# Patient Record
Sex: FEMALE | Race: WHITE | NOT HISPANIC OR LATINO | URBAN - METROPOLITAN AREA
[De-identification: names, ages, dates, MRNs, and addresses within clinical notes are randomized per-mention and may not be internally consistent; named-entity substitution may affect disease eponyms.]

---

## 2019-08-10 ENCOUNTER — INPATIENT (INPATIENT)
Facility: HOSPITAL | Age: 84
LOS: 9 days | Discharge: NURSING, REHAB OR LONG-TERM CARE | DRG: 177 | End: 2019-08-20
Attending: INTERNAL MEDICINE | Admitting: INTERNAL MEDICINE
Payer: COMMERCIAL

## 2019-08-10 VITALS
WEIGHT: 210.1 LBS | RESPIRATION RATE: 22 BRPM | OXYGEN SATURATION: 95 % | DIASTOLIC BLOOD PRESSURE: 90 MMHG | TEMPERATURE: 99 F | HEART RATE: 114 BPM | SYSTOLIC BLOOD PRESSURE: 136 MMHG

## 2019-08-10 DIAGNOSIS — C50.919 MALIGNANT NEOPLASM OF UNSPECIFIED SITE OF UNSPECIFIED FEMALE BREAST: ICD-10-CM

## 2019-08-10 DIAGNOSIS — E11.9 TYPE 2 DIABETES MELLITUS WITHOUT COMPLICATIONS: ICD-10-CM

## 2019-08-10 DIAGNOSIS — J18.1 LOBAR PNEUMONIA, UNSPECIFIED ORGANISM: ICD-10-CM

## 2019-08-10 LAB
APTT BLD: 29.2 SEC — SIGNIFICANT CHANGE UP (ref 28.5–37)
BASOPHILS # BLD AUTO: 0.06 K/UL — SIGNIFICANT CHANGE UP (ref 0–0.2)
BASOPHILS NFR BLD AUTO: 0.3 % — SIGNIFICANT CHANGE UP (ref 0–2)
EOSINOPHIL # BLD AUTO: 0.01 K/UL — SIGNIFICANT CHANGE UP (ref 0–0.5)
EOSINOPHIL NFR BLD AUTO: 0.1 % — SIGNIFICANT CHANGE UP (ref 0–6)
HCT VFR BLD CALC: 44.8 % — SIGNIFICANT CHANGE UP (ref 34.5–45)
HGB BLD-MCNC: 14 G/DL — SIGNIFICANT CHANGE UP (ref 11.5–15.5)
IMM GRANULOCYTES NFR BLD AUTO: 4.4 % — HIGH (ref 0–1.5)
INR BLD: 1.21 RATIO — HIGH (ref 0.88–1.16)
LACTATE SERPL-SCNC: 1.1 MMOL/L — SIGNIFICANT CHANGE UP (ref 0.7–2)
LYMPHOCYTES # BLD AUTO: 0.91 K/UL — LOW (ref 1–3.3)
LYMPHOCYTES # BLD AUTO: 4.9 % — LOW (ref 13–44)
MCHC RBC-ENTMCNC: 26.7 PG — LOW (ref 27–34)
MCHC RBC-ENTMCNC: 31.3 GM/DL — LOW (ref 32–36)
MCV RBC AUTO: 85.3 FL — SIGNIFICANT CHANGE UP (ref 80–100)
MONOCYTES # BLD AUTO: 2.59 K/UL — HIGH (ref 0–0.9)
MONOCYTES NFR BLD AUTO: 13.9 % — SIGNIFICANT CHANGE UP (ref 2–14)
NEUTROPHILS # BLD AUTO: 14.29 K/UL — HIGH (ref 1.8–7.4)
NEUTROPHILS NFR BLD AUTO: 76.4 % — SIGNIFICANT CHANGE UP (ref 43–77)
NRBC # BLD: 0 /100 WBCS — SIGNIFICANT CHANGE UP (ref 0–0)
PLATELET # BLD AUTO: 136 K/UL — LOW (ref 150–400)
PROTHROM AB SERPL-ACNC: 13.8 SEC — HIGH (ref 10–12.9)
RBC # BLD: 5.25 M/UL — HIGH (ref 3.8–5.2)
RBC # FLD: 15.5 % — HIGH (ref 10.3–14.5)
WBC # BLD: 18.69 K/UL — HIGH (ref 3.8–10.5)
WBC # FLD AUTO: 18.69 K/UL — HIGH (ref 3.8–10.5)

## 2019-08-10 PROCEDURE — 93010 ELECTROCARDIOGRAM REPORT: CPT

## 2019-08-10 PROCEDURE — 71045 X-RAY EXAM CHEST 1 VIEW: CPT | Mod: 26

## 2019-08-10 PROCEDURE — 99285 EMERGENCY DEPT VISIT HI MDM: CPT

## 2019-08-10 RX ORDER — PIPERACILLIN AND TAZOBACTAM 4; .5 G/20ML; G/20ML
3.38 INJECTION, POWDER, LYOPHILIZED, FOR SOLUTION INTRAVENOUS EVERY 12 HOURS
Refills: 0 | Status: DISCONTINUED | OUTPATIENT
Start: 2019-08-10 | End: 2019-08-18

## 2019-08-10 RX ORDER — PREGABALIN 225 MG/1
1000 CAPSULE ORAL DAILY
Refills: 0 | Status: DISCONTINUED | OUTPATIENT
Start: 2019-08-10 | End: 2019-08-20

## 2019-08-10 RX ORDER — PYRIDOXINE HCL (VITAMIN B6) 100 MG
1 TABLET ORAL
Qty: 0 | Refills: 0 | DISCHARGE

## 2019-08-10 RX ORDER — VANCOMYCIN HCL 1 G
1000 VIAL (EA) INTRAVENOUS ONCE
Refills: 0 | Status: COMPLETED | OUTPATIENT
Start: 2019-08-10 | End: 2019-08-10

## 2019-08-10 RX ORDER — DEXTROSE 50 % IN WATER 50 %
15 SYRINGE (ML) INTRAVENOUS ONCE
Refills: 0 | Status: DISCONTINUED | OUTPATIENT
Start: 2019-08-10 | End: 2019-08-20

## 2019-08-10 RX ORDER — LETROZOLE 2.5 MG/1
2.5 TABLET, FILM COATED ORAL DAILY
Refills: 0 | Status: DISCONTINUED | OUTPATIENT
Start: 2019-08-10 | End: 2019-08-20

## 2019-08-10 RX ORDER — OXYBUTYNIN CHLORIDE 5 MG
1 TABLET ORAL
Qty: 0 | Refills: 0 | DISCHARGE

## 2019-08-10 RX ORDER — DEXTROSE 50 % IN WATER 50 %
25 SYRINGE (ML) INTRAVENOUS ONCE
Refills: 0 | Status: DISCONTINUED | OUTPATIENT
Start: 2019-08-10 | End: 2019-08-20

## 2019-08-10 RX ORDER — SODIUM CHLORIDE 9 MG/ML
1000 INJECTION, SOLUTION INTRAVENOUS
Refills: 0 | Status: DISCONTINUED | OUTPATIENT
Start: 2019-08-10 | End: 2019-08-20

## 2019-08-10 RX ORDER — ACETAMINOPHEN 500 MG
650 TABLET ORAL EVERY 4 HOURS
Refills: 0 | Status: DISCONTINUED | OUTPATIENT
Start: 2019-08-10 | End: 2019-08-20

## 2019-08-10 RX ORDER — PYRIDOXINE HCL (VITAMIN B6) 100 MG
50 TABLET ORAL DAILY
Refills: 0 | Status: DISCONTINUED | OUTPATIENT
Start: 2019-08-10 | End: 2019-08-20

## 2019-08-10 RX ORDER — SITAGLIPTIN AND METFORMIN HYDROCHLORIDE 500; 50 MG/1; MG/1
1 TABLET, FILM COATED ORAL
Qty: 0 | Refills: 0 | DISCHARGE

## 2019-08-10 RX ORDER — DEXTROSE 50 % IN WATER 50 %
12.5 SYRINGE (ML) INTRAVENOUS ONCE
Refills: 0 | Status: DISCONTINUED | OUTPATIENT
Start: 2019-08-10 | End: 2019-08-20

## 2019-08-10 RX ORDER — LETROZOLE 2.5 MG/1
1 TABLET, FILM COATED ORAL
Qty: 0 | Refills: 0 | DISCHARGE

## 2019-08-10 RX ORDER — INSULIN LISPRO 100/ML
VIAL (ML) SUBCUTANEOUS
Refills: 0 | Status: DISCONTINUED | OUTPATIENT
Start: 2019-08-10 | End: 2019-08-20

## 2019-08-10 RX ORDER — LEVETIRACETAM 250 MG/1
500 TABLET, FILM COATED ORAL
Refills: 0 | Status: DISCONTINUED | OUTPATIENT
Start: 2019-08-10 | End: 2019-08-13

## 2019-08-10 RX ORDER — GLUCAGON INJECTION, SOLUTION 0.5 MG/.1ML
1 INJECTION, SOLUTION SUBCUTANEOUS ONCE
Refills: 0 | Status: DISCONTINUED | OUTPATIENT
Start: 2019-08-10 | End: 2019-08-20

## 2019-08-10 RX ORDER — PIPERACILLIN AND TAZOBACTAM 4; .5 G/20ML; G/20ML
3.38 INJECTION, POWDER, LYOPHILIZED, FOR SOLUTION INTRAVENOUS ONCE
Refills: 0 | Status: COMPLETED | OUTPATIENT
Start: 2019-08-10 | End: 2019-08-10

## 2019-08-10 RX ORDER — SODIUM CHLORIDE 9 MG/ML
1000 INJECTION, SOLUTION INTRAVENOUS
Refills: 0 | Status: DISCONTINUED | OUTPATIENT
Start: 2019-08-10 | End: 2019-08-16

## 2019-08-10 RX ORDER — SODIUM CHLORIDE 9 MG/ML
1000 INJECTION INTRAMUSCULAR; INTRAVENOUS; SUBCUTANEOUS ONCE
Refills: 0 | Status: COMPLETED | OUTPATIENT
Start: 2019-08-10 | End: 2019-08-10

## 2019-08-10 RX ADMIN — Medication 650 MILLIGRAM(S): at 19:20

## 2019-08-10 RX ADMIN — SODIUM CHLORIDE 1000 MILLILITER(S): 9 INJECTION INTRAMUSCULAR; INTRAVENOUS; SUBCUTANEOUS at 07:50

## 2019-08-10 RX ADMIN — SODIUM CHLORIDE 1000 MILLILITER(S): 9 INJECTION INTRAMUSCULAR; INTRAVENOUS; SUBCUTANEOUS at 08:50

## 2019-08-10 RX ADMIN — Medication 650 MILLIGRAM(S): at 18:45

## 2019-08-10 RX ADMIN — LETROZOLE 2.5 MILLIGRAM(S): 2.5 TABLET, FILM COATED ORAL at 17:08

## 2019-08-10 RX ADMIN — PIPERACILLIN AND TAZOBACTAM 3.38 GRAM(S): 4; .5 INJECTION, POWDER, LYOPHILIZED, FOR SOLUTION INTRAVENOUS at 08:11

## 2019-08-10 RX ADMIN — LEVETIRACETAM 500 MILLIGRAM(S): 250 TABLET, FILM COATED ORAL at 17:09

## 2019-08-10 RX ADMIN — Medication 1: at 17:24

## 2019-08-10 RX ADMIN — SODIUM CHLORIDE 80 MILLILITER(S): 9 INJECTION, SOLUTION INTRAVENOUS at 14:41

## 2019-08-10 RX ADMIN — PIPERACILLIN AND TAZOBACTAM 25 GRAM(S): 4; .5 INJECTION, POWDER, LYOPHILIZED, FOR SOLUTION INTRAVENOUS at 17:10

## 2019-08-10 RX ADMIN — Medication 1000 MILLIGRAM(S): at 10:01

## 2019-08-10 RX ADMIN — PIPERACILLIN AND TAZOBACTAM 200 GRAM(S): 4; .5 INJECTION, POWDER, LYOPHILIZED, FOR SOLUTION INTRAVENOUS at 07:51

## 2019-08-10 RX ADMIN — Medication 250 MILLIGRAM(S): at 09:01

## 2019-08-10 NOTE — H&P ADULT - HISTORY OF PRESENT ILLNESS
pt  with increased  temp and  sob  transferred from nh to er  cxr  shows rll infiltrate and wbc  elevated

## 2019-08-10 NOTE — ED ADULT NURSE REASSESSMENT NOTE - NS ED NURSE REASSESS COMMENT FT1
Patient stable and baseline mental status.   Denies pain or disocmfort at this time.  Care done for pt.  Pt  Md aware.  Awaiting transport to floor.  Ongoing nursing care and safety maintained.

## 2019-08-10 NOTE — H&P ADULT - NSHPPHYSICALEXAM_GEN_ALL_CORE
elderly  female  wearing  ventimask confused lungs  rhonchi rll cor regular rate abdomen soft extrmities no edema

## 2019-08-10 NOTE — ED PROVIDER NOTE - OBJECTIVE STATEMENT
89 yo white female with H/O DM and HLD sent here from [facility] for evaluation or ??? chest pain and low O2 saturation. No additional history is available at this time.

## 2019-08-10 NOTE — PATIENT PROFILE ADULT - FALL HARM RISK
age(85 years old or older)/bones(Osteoporosis,prev fx,steroid use,metastatic bone ca)/s/p fall, left wrist in partial cast, right thumb has splint on it(under is ecchymotic intact skin)/coagulation(Bleeding disorder R/T clinical cond/anti-coags)/other

## 2019-08-10 NOTE — PATIENT PROFILE ADULT - SPECIFY WHICH ONES ARE ON CHART
Health Care Proxy (HCP)/Medical Orders [facility] ([facility])/Jamar Abbott [phone] son HCP

## 2019-08-10 NOTE — ED ADULT NURSE REASSESSMENT NOTE - NS ED NURSE REASSESS COMMENT FT1
Received pt from [facility].  Pt alert able to answer questions.  Pt on cardiac monitor.  Med lock placed and blood work  sent to lab.  Patient on nonrebreather sat 95%.  Patient comfortable at this time.  Care done this am.  Left wrist has sling.  Pt was not able to answer at this time what happened. Ongoing nursing care and safety maintained. Received pt from [facility].  Pt alert able to answer questions.  Pt on cardiac monitor.  Med lock placed and blood work  sent to lab.  Patient on nonrebreather sat 95%.  Patient comfortable at this time.  Care done this am.  Left wrist has sling.  Pt was not able to answer at this time what happened. Zosyn given.  Ongoing nursing care and safety maintained. Received pt from [facility].  Pt alert able to answer questions.  Pt on cardiac monitor.  Med lock placed and blood work  sent to lab.  Patient on nonrebreather sat 95%.  Patient comfortable at this time.  Care done this am.  Left wrist has sling.  Pt was not able to answer at this time what happened. Zosyn given.  Redness to sacrum. Ongoing nursing care and safety maintained.

## 2019-08-10 NOTE — ED ADULT NURSE NOTE - NSIMPLEMENTINTERV_GEN_ALL_ED
Implemented All Fall with Harm Risk Interventions:  [place] to call system. Call bell, personal items and telephone within reach. Instruct patient to call for assistance. Room bathroom lighting operational. Non-slip footwear when patient is off stretcher. Physically safe environment: no spills, clutter or unnecessary equipment. Stretcher in lowest position, wheels locked, appropriate side rails in place. Provide visual cue, wrist band, yellow gown, etc. Monitor gait and stability. Monitor for mental status changes and reorient to person, place, and time. Review medications for side effects contributing to fall risk. Reinforce activity limits and safety measures with patient and family. Provide visual clues: red socks.

## 2019-08-10 NOTE — H&P ADULT - MINUTES
PACU Transfer Note    Vitals:    08/02/18 1415   BP: 123/83   Pulse: 82   Resp: 18   Temp: 97.7 °F (36.5 °C)   SpO2: 99%       In: 3058 [I.V.:3058]  Out: 950 [Urine:900]    Pain assessment:  present - adequately treated  Pain Level: 4 (sleeping comfortably)    Report given to Receiving unit RN.    8/2/2018 2:19 PM 60 167.64

## 2019-08-10 NOTE — ED ADULT NURSE REASSESSMENT NOTE - NSIMPLEMENTINTERV_GEN_ALL_ED
Implemented All Fall with Harm Risk Interventions:  [place] to call system. Call bell, personal items and telephone within reach. Instruct patient to call for assistance. Room bathroom lighting operational. Non-slip footwear when patient is off stretcher. Physically safe environment: no spills, clutter or unnecessary equipment. Stretcher in lowest position, wheels locked, appropriate side rails in place. Provide visual cue, wrist band, yellow gown, etc. Monitor gait and stability. Monitor for mental status changes and reorient to person, place, and time. Review medications for side effects contributing to fall risk. Reinforce activity limits and safety measures with patient and family. Provide visual clues: red socks.

## 2019-08-10 NOTE — ED ADULT NURSE NOTE - OBJECTIVE STATEMENT
Patient BIBA from nursing home with c/o R sided CP x1 month.  Patient arrives tachypneic and 73% on RA.  Patient placed on NRB, EKG in progress. Patient BIBA from nursing home with c/o R sided CP x1 month.  Patient arrives tachypneic and 73% on RA.  Patient placed on NRB, EKG in progress. Zofran and vanco given.  Pt on nonrebreather sat 94%.  Linda placed blood work obtained and sent to lab.

## 2019-08-11 LAB
-  COAGULASE NEGATIVE STAPHYLOCOCCUS: SIGNIFICANT CHANGE UP
ANION GAP SERPL CALC-SCNC: 7 MMOL/L — SIGNIFICANT CHANGE UP (ref 5–17)
BUN SERPL-MCNC: 29 MG/DL — HIGH (ref 7–23)
CALCIUM SERPL-MCNC: 8.6 MG/DL — SIGNIFICANT CHANGE UP (ref 8.5–10.1)
CHLORIDE SERPL-SCNC: 112 MMOL/L — HIGH (ref 96–108)
CO2 SERPL-SCNC: 27 MMOL/L — SIGNIFICANT CHANGE UP (ref 22–31)
CREAT SERPL-MCNC: 0.75 MG/DL — SIGNIFICANT CHANGE UP (ref 0.5–1.3)
GLUCOSE SERPL-MCNC: 155 MG/DL — HIGH (ref 70–99)
GRAM STN FLD: SIGNIFICANT CHANGE UP
HBA1C BLD-MCNC: 6.9 % — HIGH (ref 4–5.6)
HCT VFR BLD CALC: 38.8 % — SIGNIFICANT CHANGE UP (ref 34.5–45)
HGB BLD-MCNC: 12 G/DL — SIGNIFICANT CHANGE UP (ref 11.5–15.5)
MCHC RBC-ENTMCNC: 26.7 PG — LOW (ref 27–34)
MCHC RBC-ENTMCNC: 30.9 GM/DL — LOW (ref 32–36)
MCV RBC AUTO: 86.4 FL — SIGNIFICANT CHANGE UP (ref 80–100)
METHOD TYPE: SIGNIFICANT CHANGE UP
NRBC # BLD: 0 /100 WBCS — SIGNIFICANT CHANGE UP (ref 0–0)
PLATELET # BLD AUTO: 108 K/UL — LOW (ref 150–400)
POTASSIUM SERPL-MCNC: 3.9 MMOL/L — SIGNIFICANT CHANGE UP (ref 3.5–5.3)
POTASSIUM SERPL-SCNC: 3.9 MMOL/L — SIGNIFICANT CHANGE UP (ref 3.5–5.3)
RBC # BLD: 4.49 M/UL — SIGNIFICANT CHANGE UP (ref 3.8–5.2)
RBC # FLD: 15.2 % — HIGH (ref 10.3–14.5)
SODIUM SERPL-SCNC: 146 MMOL/L — HIGH (ref 135–145)
WBC # BLD: 12.37 K/UL — HIGH (ref 3.8–10.5)
WBC # FLD AUTO: 12.37 K/UL — HIGH (ref 3.8–10.5)

## 2019-08-11 RX ADMIN — PREGABALIN 1000 MICROGRAM(S): 225 CAPSULE ORAL at 12:26

## 2019-08-11 RX ADMIN — SODIUM CHLORIDE 80 MILLILITER(S): 9 INJECTION, SOLUTION INTRAVENOUS at 14:39

## 2019-08-11 RX ADMIN — Medication 50 MILLIGRAM(S): at 12:26

## 2019-08-11 RX ADMIN — PIPERACILLIN AND TAZOBACTAM 25 GRAM(S): 4; .5 INJECTION, POWDER, LYOPHILIZED, FOR SOLUTION INTRAVENOUS at 18:14

## 2019-08-11 RX ADMIN — PIPERACILLIN AND TAZOBACTAM 25 GRAM(S): 4; .5 INJECTION, POWDER, LYOPHILIZED, FOR SOLUTION INTRAVENOUS at 05:21

## 2019-08-11 RX ADMIN — LEVETIRACETAM 500 MILLIGRAM(S): 250 TABLET, FILM COATED ORAL at 05:21

## 2019-08-11 RX ADMIN — LEVETIRACETAM 500 MILLIGRAM(S): 250 TABLET, FILM COATED ORAL at 18:14

## 2019-08-11 RX ADMIN — SODIUM CHLORIDE 80 MILLILITER(S): 9 INJECTION, SOLUTION INTRAVENOUS at 06:31

## 2019-08-11 RX ADMIN — LETROZOLE 2.5 MILLIGRAM(S): 2.5 TABLET, FILM COATED ORAL at 12:26

## 2019-08-12 DIAGNOSIS — S06.6X0D TRAUMATIC SUBARACHNOID HEMORRHAGE WITHOUT LOSS OF CONSCIOUSNESS, SUBSEQUENT ENCOUNTER: ICD-10-CM

## 2019-08-12 DIAGNOSIS — E78.5 HYPERLIPIDEMIA, UNSPECIFIED: ICD-10-CM

## 2019-08-12 DIAGNOSIS — E11.618 TYPE 2 DIABETES MELLITUS WITH OTHER DIABETIC ARTHROPATHY: ICD-10-CM

## 2019-08-12 LAB
ANION GAP SERPL CALC-SCNC: 6 MMOL/L — SIGNIFICANT CHANGE UP (ref 5–17)
APPEARANCE UR: ABNORMAL
BILIRUB UR-MCNC: NEGATIVE — SIGNIFICANT CHANGE UP
BUN SERPL-MCNC: 21 MG/DL — SIGNIFICANT CHANGE UP (ref 7–23)
CALCIUM SERPL-MCNC: 8.2 MG/DL — LOW (ref 8.5–10.1)
CHLORIDE SERPL-SCNC: 113 MMOL/L — HIGH (ref 96–108)
CO2 SERPL-SCNC: 27 MMOL/L — SIGNIFICANT CHANGE UP (ref 22–31)
COLOR SPEC: YELLOW — SIGNIFICANT CHANGE UP
CREAT SERPL-MCNC: 0.53 MG/DL — SIGNIFICANT CHANGE UP (ref 0.5–1.3)
CULTURE RESULTS: SIGNIFICANT CHANGE UP
DIFF PNL FLD: ABNORMAL
GLUCOSE SERPL-MCNC: 139 MG/DL — HIGH (ref 70–99)
GLUCOSE UR QL: NEGATIVE — SIGNIFICANT CHANGE UP
HCT VFR BLD CALC: 36.1 % — SIGNIFICANT CHANGE UP (ref 34.5–45)
HGB BLD-MCNC: 11.1 G/DL — LOW (ref 11.5–15.5)
KETONES UR-MCNC: NEGATIVE — SIGNIFICANT CHANGE UP
LEUKOCYTE ESTERASE UR-ACNC: ABNORMAL
MCHC RBC-ENTMCNC: 26.5 PG — LOW (ref 27–34)
MCHC RBC-ENTMCNC: 30.7 GM/DL — LOW (ref 32–36)
MCV RBC AUTO: 86.2 FL — SIGNIFICANT CHANGE UP (ref 80–100)
NITRITE UR-MCNC: NEGATIVE — SIGNIFICANT CHANGE UP
NRBC # BLD: 0 /100 WBCS — SIGNIFICANT CHANGE UP (ref 0–0)
ORGANISM # SPEC MICROSCOPIC CNT: SIGNIFICANT CHANGE UP
ORGANISM # SPEC MICROSCOPIC CNT: SIGNIFICANT CHANGE UP
PH UR: 5 — SIGNIFICANT CHANGE UP (ref 5–8)
PLATELET # BLD AUTO: 112 K/UL — LOW (ref 150–400)
POTASSIUM SERPL-MCNC: 3.8 MMOL/L — SIGNIFICANT CHANGE UP (ref 3.5–5.3)
POTASSIUM SERPL-SCNC: 3.8 MMOL/L — SIGNIFICANT CHANGE UP (ref 3.5–5.3)
PROT UR-MCNC: NEGATIVE — SIGNIFICANT CHANGE UP
RBC # BLD: 4.19 M/UL — SIGNIFICANT CHANGE UP (ref 3.8–5.2)
RBC # FLD: 15.3 % — HIGH (ref 10.3–14.5)
SODIUM SERPL-SCNC: 146 MMOL/L — HIGH (ref 135–145)
SP GR SPEC: 1.01 — SIGNIFICANT CHANGE UP (ref 1.01–1.02)
SPECIMEN SOURCE: SIGNIFICANT CHANGE UP
UROBILINOGEN FLD QL: NEGATIVE — SIGNIFICANT CHANGE UP
WBC # BLD: 9.82 K/UL — SIGNIFICANT CHANGE UP (ref 3.8–10.5)
WBC # FLD AUTO: 9.82 K/UL — SIGNIFICANT CHANGE UP (ref 3.8–10.5)

## 2019-08-12 PROCEDURE — 73130 X-RAY EXAM OF HAND: CPT | Mod: 26,LT

## 2019-08-12 PROCEDURE — 70450 CT HEAD/BRAIN W/O DYE: CPT | Mod: 26

## 2019-08-12 PROCEDURE — 73110 X-RAY EXAM OF WRIST: CPT | Mod: 26,LT

## 2019-08-12 RX ORDER — ACETAMINOPHEN 500 MG
650 TABLET ORAL EVERY 6 HOURS
Refills: 0 | Status: DISCONTINUED | OUTPATIENT
Start: 2019-08-12 | End: 2019-08-20

## 2019-08-12 RX ADMIN — Medication 1: at 08:11

## 2019-08-12 RX ADMIN — SODIUM CHLORIDE 80 MILLILITER(S): 9 INJECTION, SOLUTION INTRAVENOUS at 17:43

## 2019-08-12 RX ADMIN — PIPERACILLIN AND TAZOBACTAM 25 GRAM(S): 4; .5 INJECTION, POWDER, LYOPHILIZED, FOR SOLUTION INTRAVENOUS at 05:59

## 2019-08-12 RX ADMIN — LEVETIRACETAM 500 MILLIGRAM(S): 250 TABLET, FILM COATED ORAL at 05:59

## 2019-08-12 RX ADMIN — LEVETIRACETAM 500 MILLIGRAM(S): 250 TABLET, FILM COATED ORAL at 17:37

## 2019-08-12 RX ADMIN — LETROZOLE 2.5 MILLIGRAM(S): 2.5 TABLET, FILM COATED ORAL at 12:11

## 2019-08-12 RX ADMIN — PIPERACILLIN AND TAZOBACTAM 25 GRAM(S): 4; .5 INJECTION, POWDER, LYOPHILIZED, FOR SOLUTION INTRAVENOUS at 17:36

## 2019-08-12 RX ADMIN — Medication 2: at 12:11

## 2019-08-12 RX ADMIN — PREGABALIN 1000 MICROGRAM(S): 225 CAPSULE ORAL at 12:11

## 2019-08-12 RX ADMIN — Medication 50 MILLIGRAM(S): at 12:11

## 2019-08-12 NOTE — PHYSICAL THERAPY INITIAL EVALUATION ADULT - DIAGNOSIS, PT EVAL
Functional decline, weakness, s/p bilateral thumb fx, limited ROM to LUE (elbow and hand), deconditioned

## 2019-08-12 NOTE — PROGRESS NOTE ADULT - ASSESSMENT
87 yo f , hx of Hypertension , osteoarthritis , osteoporosis hld , weakness , falls , recent SAH , and inured left hand and in soft cast , was in rehab , c/o chest pain and transferred to er , vital signs stable , and labs stable , denies cp , also ? recent uti , and bladder issues ,   patient has hcp and molst with dnr and dni , and will get ortho eval , abx for utio , dvt and gi prophylax , and repeat x rays of hands and also repeat mri brain , 89 yo f , hx of Hypertension , osteoarthritis , osteoporosis hld , weakness , falls , recent SAH , hx of breast ca ,  and inured left hand and in soft cast , was in rehab , c/o chest pain and transferred to er , vital signs stable , and labs stable , denies cp , also ? recent uti , and bladder issues , in er noted to have infiltrate on cxr and admitted for pneumonia on iv abx ,   patient has hcp and molst with dnr and dni , and will get ortho eval , abx for utio , dvt and gi prophylax , and repeat x rays of hands and also repeat mri brain , patient placed on seizure prophylaxis post recent SAH

## 2019-08-12 NOTE — PHYSICAL THERAPY INITIAL EVALUATION ADULT - BALANCE DISTURBANCE, IDENTIFIED IMPAIRMENT CONTRIBUTE, REHAB EVAL
impaired motor control/abnormal muscle tone/impaired coordination/impaired postural control/decreased strength/decreased ROM

## 2019-08-12 NOTE — ADVANCED PRACTICE NURSE CONSULT - ASSESSMENT
Patient is an 87yo bedbound female admitted for lobar PNA HX: HLD, DM, Presents with sacral region deep tissue pressure injury 1 x 1 with linear blanchable erythema present denies discomfort or pain

## 2019-08-12 NOTE — PHYSICAL THERAPY INITIAL EVALUATION ADULT - IMPAIRMENTS FOUND, PT EVAL
poor safety awareness/arousal, attention, and cognition/aerobic capacity/endurance/fine motor/muscle strength/posture/cognitive impairment/joint integrity and mobility/ROM/gait, locomotion, and balance/gross motor

## 2019-08-12 NOTE — PHYSICAL THERAPY INITIAL EVALUATION ADULT - GENERAL OBSERVATIONS, REHAB EVAL
Pt rec'd in 2 [place] bed /c 40% venti-mask, left hand/wrist case, thumb splint ot right 1st digit (thumb), ecchymosis noted to left orbital region and NAD but lethargic. Pt was arousalable but noted to have delayed processing and response time. Pt was agreeable /c PT eval.

## 2019-08-12 NOTE — PHYSICAL THERAPY INITIAL EVALUATION ADULT - ADDITIONAL COMMENTS
[pt noted to have delayed response time, confusion and possible poor historian. Pt reports she lives alone in a private home /c no steps and was independent /c ambulation /c rolling walker and or single axis cane. Pt reports being independent /c ADLs, transfers and ambulation. However per EMR pt form [facility]?

## 2019-08-12 NOTE — PHYSICAL THERAPY INITIAL EVALUATION ADULT - BALANCE TRAINING, PT EVAL
Improve sitting balance to fair in 5 sessions and assess standing/dynamic /c appropriate device /c in 3 sessions

## 2019-08-12 NOTE — PHYSICAL THERAPY INITIAL EVALUATION ADULT - DID THE PATIENT HAVE SURGERY?
n/a/CT Head: (+) subtle right frontal gyriform increased attenutatuion which could reflect focal subarachnoid hemorrhage; X-ray to left hand: limited due to cast material, questionable base of 1st base metacarpal fracture (nondisplaced); X-ray to right hand: first distal phalanx fx; chest X-ray: (+) right lower lobe infiltrate, cardiomegaly & prominence of intestinal markings

## 2019-08-12 NOTE — PHYSICAL THERAPY INITIAL EVALUATION ADULT - MANUAL MUSCLE TESTING RESULTS, REHAB EVAL
grossly assessed due to/RUE 3+/5 /c in available range, left UE <3-/5, BLE </ 3 to 3+/5 /c in available range

## 2019-08-12 NOTE — ADVANCED PRACTICE NURSE CONSULT - RECOMMEDATIONS
Turn & reposition Q2h  Apply cavilon daily, critic-aide every shift   Maintain moisture free environment

## 2019-08-12 NOTE — PHYSICAL THERAPY INITIAL EVALUATION ADULT - SKIN COLOR/CHARACTERISTICS
female cath, 40% venti-mask, left hand/wrist cast/splint and right thumb splint/bruised (ecchymotic)

## 2019-08-12 NOTE — PHYSICAL THERAPY INITIAL EVALUATION ADULT - PERTINENT HX OF CURRENT PROBLEM, REHAB EVAL
As per H&P:"Pt with increased  temp and  sob  transferred from nh to er  cxr  shows rll infiltrate and wbc  elevated

## 2019-08-13 LAB
ANION GAP SERPL CALC-SCNC: 6 MMOL/L — SIGNIFICANT CHANGE UP (ref 5–17)
BASOPHILS # BLD AUTO: 0 K/UL — SIGNIFICANT CHANGE UP (ref 0–0.2)
BASOPHILS NFR BLD AUTO: 0 % — SIGNIFICANT CHANGE UP (ref 0–2)
BUN SERPL-MCNC: 13 MG/DL — SIGNIFICANT CHANGE UP (ref 7–23)
CALCIUM SERPL-MCNC: 8.6 MG/DL — SIGNIFICANT CHANGE UP (ref 8.5–10.1)
CHLORIDE SERPL-SCNC: 109 MMOL/L — HIGH (ref 96–108)
CO2 SERPL-SCNC: 28 MMOL/L — SIGNIFICANT CHANGE UP (ref 22–31)
CREAT SERPL-MCNC: 0.45 MG/DL — LOW (ref 0.5–1.3)
CULTURE RESULTS: SIGNIFICANT CHANGE UP
EOSINOPHIL # BLD AUTO: 0.17 K/UL — SIGNIFICANT CHANGE UP (ref 0–0.5)
EOSINOPHIL NFR BLD AUTO: 2 % — SIGNIFICANT CHANGE UP (ref 0–6)
GLUCOSE SERPL-MCNC: 139 MG/DL — HIGH (ref 70–99)
HCT VFR BLD CALC: 36.7 % — SIGNIFICANT CHANGE UP (ref 34.5–45)
HGB BLD-MCNC: 11.4 G/DL — LOW (ref 11.5–15.5)
LG PLATELETS BLD QL AUTO: SLIGHT — SIGNIFICANT CHANGE UP
LYMPHOCYTES # BLD AUTO: 0.78 K/UL — LOW (ref 1–3.3)
LYMPHOCYTES # BLD AUTO: 9 % — LOW (ref 13–44)
MANUAL SMEAR VERIFICATION: SIGNIFICANT CHANGE UP
MCHC RBC-ENTMCNC: 26.4 PG — LOW (ref 27–34)
MCHC RBC-ENTMCNC: 31.1 GM/DL — LOW (ref 32–36)
MCV RBC AUTO: 85 FL — SIGNIFICANT CHANGE UP (ref 80–100)
MONOCYTES # BLD AUTO: 1.12 K/UL — HIGH (ref 0–0.9)
MONOCYTES NFR BLD AUTO: 13 % — SIGNIFICANT CHANGE UP (ref 2–14)
NEUTROPHILS # BLD AUTO: 6.57 K/UL — SIGNIFICANT CHANGE UP (ref 1.8–7.4)
NEUTROPHILS NFR BLD AUTO: 72 % — SIGNIFICANT CHANGE UP (ref 43–77)
NEUTS BAND # BLD: 4 % — SIGNIFICANT CHANGE UP (ref 0–8)
NRBC # BLD: 0 — SIGNIFICANT CHANGE UP
NRBC # BLD: SIGNIFICANT CHANGE UP /100 WBCS (ref 0–0)
PLAT MORPH BLD: NORMAL — SIGNIFICANT CHANGE UP
PLATELET # BLD AUTO: 103 K/UL — LOW (ref 150–400)
POTASSIUM SERPL-MCNC: 3.6 MMOL/L — SIGNIFICANT CHANGE UP (ref 3.5–5.3)
POTASSIUM SERPL-SCNC: 3.6 MMOL/L — SIGNIFICANT CHANGE UP (ref 3.5–5.3)
RBC # BLD: 4.32 M/UL — SIGNIFICANT CHANGE UP (ref 3.8–5.2)
RBC # FLD: 14.6 % — HIGH (ref 10.3–14.5)
RBC BLD AUTO: SIGNIFICANT CHANGE UP
SODIUM SERPL-SCNC: 143 MMOL/L — SIGNIFICANT CHANGE UP (ref 135–145)
SPECIMEN SOURCE: SIGNIFICANT CHANGE UP
WBC # BLD: 8.64 K/UL — SIGNIFICANT CHANGE UP (ref 3.8–10.5)
WBC # FLD AUTO: 8.64 K/UL — SIGNIFICANT CHANGE UP (ref 3.8–10.5)

## 2019-08-13 RX ORDER — LEVETIRACETAM 250 MG/1
250 TABLET, FILM COATED ORAL
Refills: 0 | Status: DISCONTINUED | OUTPATIENT
Start: 2019-08-13 | End: 2019-08-15

## 2019-08-13 RX ADMIN — SODIUM CHLORIDE 80 MILLILITER(S): 9 INJECTION, SOLUTION INTRAVENOUS at 17:12

## 2019-08-13 RX ADMIN — LETROZOLE 2.5 MILLIGRAM(S): 2.5 TABLET, FILM COATED ORAL at 11:21

## 2019-08-13 RX ADMIN — Medication 1: at 16:58

## 2019-08-13 RX ADMIN — LEVETIRACETAM 250 MILLIGRAM(S): 250 TABLET, FILM COATED ORAL at 17:09

## 2019-08-13 RX ADMIN — PIPERACILLIN AND TAZOBACTAM 25 GRAM(S): 4; .5 INJECTION, POWDER, LYOPHILIZED, FOR SOLUTION INTRAVENOUS at 06:03

## 2019-08-13 RX ADMIN — PIPERACILLIN AND TAZOBACTAM 25 GRAM(S): 4; .5 INJECTION, POWDER, LYOPHILIZED, FOR SOLUTION INTRAVENOUS at 17:09

## 2019-08-13 RX ADMIN — LEVETIRACETAM 500 MILLIGRAM(S): 250 TABLET, FILM COATED ORAL at 06:04

## 2019-08-13 RX ADMIN — Medication 50 MILLIGRAM(S): at 11:20

## 2019-08-13 RX ADMIN — PREGABALIN 1000 MICROGRAM(S): 225 CAPSULE ORAL at 11:21

## 2019-08-13 NOTE — PROGRESS NOTE ADULT - ASSESSMENT
87 yo f , hx of Hypertension , osteoarthritis , osteoporosis hld , weakness , falls , recent SAH , hx of breast ca ,  and inured left hand and in soft cast , was in rehab , c/o chest pain and transferred to er , vital signs stable , and labs stable , denies cp , also ? recent uti , and bladder issues , in er noted to have infiltrate on cxr and admitted for pneumonia on iv abx ,   patient has hcp and molst with dnr and dni , and will get ortho eval , abx for utio , dvt and gi prophylax , and repeat x rays of hands and also repeat mri brain , patient placed on seizure prophylaxis post recent SAH   on 8/13/19 , patient stable , numc called and neuro noted , ct head and SAH stable ,   urinary retention , foly placed get gu eval , start flomax ,   pneumonia on iv abx ,   case d/w son , advance directives reviewed ,

## 2019-08-13 NOTE — SWALLOW BEDSIDE ASSESSMENT ADULT - COMMENTS
MD orders received. Chart reviewed. Attempted to engage pt in a clinical assessment of swallow function this PM at which time pt refused ST intervention/provision of PO trials. Pt educated on rationale for clinical swallow evaluation as well as risks of penetration/aspiration (i.e. pneumonia). However, pt states she "does not have any problems" and "is not interested" in engaging in today's evaluation.     Will defer PO diet to MD at this time given pt refusal. Suggest RD consultation to ensure pt is meeting daily caloric needs.    Today's visit discussed with RN.

## 2019-08-14 PROCEDURE — 76770 US EXAM ABDO BACK WALL COMP: CPT | Mod: 26

## 2019-08-14 RX ORDER — TAMSULOSIN HYDROCHLORIDE 0.4 MG/1
0.4 CAPSULE ORAL AT BEDTIME
Refills: 0 | Status: DISCONTINUED | OUTPATIENT
Start: 2019-08-14 | End: 2019-08-20

## 2019-08-14 RX ADMIN — TAMSULOSIN HYDROCHLORIDE 0.4 MILLIGRAM(S): 0.4 CAPSULE ORAL at 21:11

## 2019-08-14 RX ADMIN — Medication 650 MILLIGRAM(S): at 14:30

## 2019-08-14 RX ADMIN — LEVETIRACETAM 250 MILLIGRAM(S): 250 TABLET, FILM COATED ORAL at 05:22

## 2019-08-14 RX ADMIN — SODIUM CHLORIDE 80 MILLILITER(S): 9 INJECTION, SOLUTION INTRAVENOUS at 05:22

## 2019-08-14 RX ADMIN — Medication 650 MILLIGRAM(S): at 13:54

## 2019-08-14 RX ADMIN — PIPERACILLIN AND TAZOBACTAM 25 GRAM(S): 4; .5 INJECTION, POWDER, LYOPHILIZED, FOR SOLUTION INTRAVENOUS at 05:22

## 2019-08-14 RX ADMIN — PIPERACILLIN AND TAZOBACTAM 25 GRAM(S): 4; .5 INJECTION, POWDER, LYOPHILIZED, FOR SOLUTION INTRAVENOUS at 18:02

## 2019-08-14 RX ADMIN — LEVETIRACETAM 250 MILLIGRAM(S): 250 TABLET, FILM COATED ORAL at 18:02

## 2019-08-14 RX ADMIN — LETROZOLE 2.5 MILLIGRAM(S): 2.5 TABLET, FILM COATED ORAL at 13:04

## 2019-08-14 RX ADMIN — PREGABALIN 1000 MICROGRAM(S): 225 CAPSULE ORAL at 13:04

## 2019-08-14 RX ADMIN — Medication 50 MILLIGRAM(S): at 13:04

## 2019-08-14 NOTE — CONSULT NOTE ADULT - ASSESSMENT
88f with dm, hld, sah, urinary retention, left wrist fx  admitted leukocytosis, tachy cardia, tachypnea  found to have rll pna
Plan:  - Continue splint for 2 weeks  - RUE elevation  - Consider repeat wrist xray  - F/U as outpatient

## 2019-08-14 NOTE — CONSULT NOTE ADULT - SUBJECTIVE AND OBJECTIVE BOX
FREEDOM MARTINO  [id number]  PLV 2NOR 229 W1    88yFemale, RHD, presents s/p fall with pneumonia.  Plastic surgery called to evaluate suspected fracture of right thumb.    PMHx/PSHx:  Lobar pneumonia  Yes  Handoff  MEWS Score  Hyperlipidemia  Diabetes mellitus  Pneumonia of right lower lobe due to infectious organism  Subarachnoid hematoma, without loss of consciousness, subsequent encounter  Malignant neoplasm of breast in male, estrogen receptor positive, unspecified laterality, unspecified site of breast  Hyperlipidemia, unspecified hyperlipidemia type  Type 2 diabetes mellitus with other diabetic arthropathy, without long-term current use of insulin  Breast CA  Diabetes mellitus  Pneumonia of right lower lobe due to infectious organism  No significant past surgical history  CHEST PAIN X 1 MONTH  90+      acetaminophen   Tablet .. 650 milliGRAM(s) Oral every 4 hours PRN  acetaminophen   Tablet .. 650 milliGRAM(s) Oral every 6 hours PRN  cyanocobalamin 1000 MICROGram(s) Oral daily  dextrose 40% Gel 15 Gram(s) Oral once PRN  dextrose 5% + sodium chloride 0.9%. 1000 milliLiter(s) IV Continuous <Continuous>  dextrose 5%. 1000 milliLiter(s) IV Continuous <Continuous>  dextrose 50% Injectable 12.5 Gram(s) IV Push once  dextrose 50% Injectable 25 Gram(s) IV Push once  dextrose 50% Injectable 25 Gram(s) IV Push once  glucagon  Injectable 1 milliGRAM(s) IntraMuscular once PRN  insulin lispro (HumaLOG) corrective regimen sliding scale   SubCutaneous three times a day before meals  letrozole 2.5 milliGRAM(s) Oral daily  levETIRAcetam 250 milliGRAM(s) Oral two times a day  piperacillin/tazobactam IVPB.. 3.375 Gram(s) IV Intermittent every 12 hours  pyridoxine 50 milliGRAM(s) Oral daily  tamsulosin 0.4 milliGRAM(s) Oral at bedtime      No Known Allergies      T(C): 37.2 (08-14-19 @ 07:32), Max: 37.7 (08-13-19 @ 15:59)  HR: 81 (08-14-19 @ 07:32) (73 - 88)  BP: 146/80 (08-14-19 @ 07:32) (121/77 - 150/83)  RR: 20 (08-14-19 @ 07:32) (18 - 20)  SpO2: 94% (08-14-19 @ 07:32) (93% - 95%)  NAD  RUE:  Mild tenderness in distal phalanx of thumb.  Arthritic changes. +FPL/+OP/+FDS/+FDP/+Extension in DIP/PIP/MCP joints of all digits.  Cap refill <3s.  +UDN/+RDN in all digits.  Soft compartments.      Xray:  Right thumb distal phalanx fracture.

## 2019-08-14 NOTE — CONSULT NOTE ADULT - SUBJECTIVE AND OBJECTIVE BOX
[facility], Division of Infectious Diseases  HALLIE Claudio A. Lee  [phone]    GUNNER, FREEDOM  88y, Female  [id number]      HPI: poor historian history per pt and chart  88f with h/o dm here with fever and sob.  states she does not have a cough and is not sure why she is here.  she thinks she fell because there are too many people where she is staying and they cannot  accomodate that many people.  She does not have fevers or chills, no nausea, no vomiting.    She was found to have rll infiltrate and started on antibx.  Prior to this, pt was a [facility] for fall SAH and left wrist fx.  She had some urinary retention there and she was ultimately discharged to rehab.   Then sent here for above complaints.      PMH/PSH--  Hyperlipidemia  Diabetes mellitus      Allergies--  NKDA    Medications--  Antibiotics: piperacillin/tazobactam IVPB.. 3.375 Gram(s) IV Intermittent every 12 hours    Immunologic:   Other: acetaminophen   Tablet .. PRN  acetaminophen   Tablet .. PRN  cyanocobalamin  dextrose 40% Gel PRN  dextrose 5% + sodium chloride 0.9%.  dextrose 5%.  dextrose 50% Injectable  dextrose 50% Injectable  dextrose 50% Injectable  glucagon  Injectable PRN  insulin lispro (HumaLOG) corrective regimen sliding scale  letrozole  levETIRAcetam  pyridoxine  tamsulosin      Social History--  EtOH: denies ***  Tobacco: denies ***  Drug Use: denies ***    Family/Marital History--    NC  Remainder not relevant to clinical concern.    Travel/Environmental/Occupational History:  retired  "had many jobs:    Review of Systems:  A >=10-point review of systems was obtained.     Pertinent positives and negatives--  Constitutional: No fevers. No Chills. No Rigors.   Eyes: no blurry vision  ENMT: no sore throat  Cardiovascular: No chest pain. No palpitations.  Respiratory: No shortness of breath. No cough.  Gastrointestinal: No nausea or vomiting. No diarrhea or constipation.   Genitourinary: no dysuria  Musculoskeletal: + left arm pain  Skin: no rash  Neurologic: no headache although mild cognittive dysfunciton  Psychiatric: no depression  Review of systems otherwise negative except as previously noted.    Physical Exam--  Vital Signs: T(F): 98.9 (08-14-19 @ 07:32), Max: 99.8 (08-13-19 @ 15:59)  HR: 81 (08-14-19 @ 07:32)  BP: 146/80 (08-14-19 @ 07:32)  RR: 20 (08-14-19 @ 07:32)  SpO2: 94% (08-14-19 @ 07:32)  Wt(kg): --  General: Nontoxic-appearing Female in no acute distress.  HEENT: AT/NC.+FM.  Neck: Not rigid. No sense of mass.  Nodes: None palpable.  Lungs: decreased bs at bases  Heart: Regular rate and rhythm. No Murmur.   Abdomen: Bowel sounds present and normoactive. Soft. Nondistended. Nontender.    joy  Extremities: No cyanosis or clubbing. ++ edema.   Skin: Warm. Dry. Good turgor. No rash. No vasculitic stigmata.  Psychiatric: Appropriate affect and mood for situation.         Laboratory & Imaging Data--  CBC                        11.4   8.64  )-----------( 103      ( 13 Aug 2019 05:08 )             36.7       Chemistries  08-13    143  |  109<H>  |  13  ----------------------------<  139<H>  3.6   |  28  |  0.45<L>    Ca    8.6      13 Aug 2019 05:08        Culture Data    Culture - Urine (collected 12 Aug 2019 08:54)  Source: .Urine Clean Catch (Midstream)  Final Report (13 Aug 2019 06:52):    <10,000 CFU/mL Normal Urogenital Shanae    Culture - Blood (collected 10 Aug 2019 10:49)  Source: .Blood Blood-Peripheral  Preliminary Report (11 Aug 2019 11:01):    No growth to date.    Culture - Blood (collected 10 Aug 2019 10:49)  Source: .Blood Blood-Peripheral  Gram Stain (11 Aug 2019 08:06):    Growth in aerobic bottle: Gram Positive Cocci in Clusters  Final Report (12 Aug 2019 11:23):    Growth in aerobic bottle: Multiple Morphological Strains Coag Negative    Staphylococcus    Single set isolate, possible contaminant. Contact    Microbiology if susceptibility testing clinically    indicated.    "Due to technical problems, Proteus sp. will Not be reported as part of    the BCID panel until further notice"    ***Blood Panel PCR results on this specimen are available    approximately 3 hours after the Gram stain result.***    Gram stain, PCR, and/or culture results may not always    correspond due to difference in methodologies.    ************************************************************    This PCR assay was performed using [employer].    The following targets are tested for: Enterococcus,    vancomycin resistant enterococci, Listeria monocytogenes,    coagulase negative staphylococci, S. aureus,    methicillin resistant S. aureus, Streptococcus agalactiae    (Group B), S. pneumoniae, S. pyogenes (Group A),    Acinetobacter baumannii, Enterobacter cloacae, E. coli,    Klebsiella oxytoca, K. pneumoniae, Proteus sp.,    Serratia marcescens, Haemophilus influenzae,    Neisseria meningitidis, Pseudomonas aeruginosa, Candida    albicans, C. glabrata, C krusei, C parapsilosis,    C. tropicalis and the KPC resistance gene.  Organism: Blood Culture PCR (12 Aug 2019 11:23)  Organism: Blood Culture PCR (12 Aug 2019 11:23)        < from: Xray Hand 3 Views, Left (08.12.19 @ 10:45) >  XAM:  HAND LEFT (MINIMUM 3 VIEWS)                            PROCEDURE DATE:  08/12/2019          INTERPRETATION:  Left hand    HISTORY: Pain after falling today      Three views of the left hand show cast material extending from the first   interphalangeal joint down to the distal radius. There is a questionable   nondisplaced fracture of the base of the first metacarpal. Soft tissue   swelling is seen along the metacarpal phalangeal joints in the lateral   view.     IMPRESSION: Limited by cast material. Questionable first metacarpal   fracture.    Thank you for this referral.    < end of copied text >      < from: Xray Chest 1 View-PORTABLE IMMEDIATE (08.10.19 @ 07:36) >    EXAM:  XR CHEST PORTABLE IMMED 1V                            PROCEDURE DATE:  08/10/2019          INTERPRETATION:  cough      A single portable radiograph suggests cardiomegaly, although there may be   magnification from technique..     No acute congestive changes are seen.     Interstitial markings are increased. A right lower lobe infiltrate is   suggested.    No pleural fluid is evident.     The osseous structures are grossly intact.    IMPRESSION:    Right lower lobe infiltrate. Prominence of interstitial markings.   Cardiomegaly..        < end of copied text >

## 2019-08-14 NOTE — PROGRESS NOTE ADULT - ASSESSMENT
87 yo f , hx of Hypertension , osteoarthritis , osteoporosis hld , weakness , falls , recent SAH , hx of breast ca ,  and inured left hand and in soft cast , was in rehab , c/o chest pain and transferred to er , vital signs stable , and labs stable , denies cp , also ? recent uti , and bladder issues , in er noted to have infiltrate on cxr and admitted for pneumonia on iv abx ,   patient has hcp and molst with dnr and dni , and will get ortho eval , abx for utio , dvt and gi prophylax , and repeat x rays of hands and also repeat mri brain , patient placed on seizure prophylaxis post recent SAH   on 8/13/19 , patient stable , numc called and neuro noted , ct head and SAH stable ,   urinary retention , foly placed get gu eval , start flomax ,   pneumonia on iv abx ,   case d/w son , advance directives reviewed ,   on 8/14/19 patient stable , depressed ,   ID pneumonia finish abx till 8/17/19,   urinary retention ,  noted , flomax , dc and void trial when more mobile ,    neuro , taper anti seizure meds , , dc to rehab soon   fall and hand fx , ortho plastic hand surgeon noted , keep splint repeat xrays in few weeks and f/u as out patient

## 2019-08-14 NOTE — CONSULT NOTE ADULT - PROBLEM SELECTOR RECOMMENDATION 9
covered for hcap/aspiration  clinically improving  8/10--1 /4 bottles ++ cns-- contaminant  leukocytosis resolved  afeb  oxygenation improving  complete 7 days of zosyn to complete course til   august 17

## 2019-08-14 NOTE — DIETITIAN INITIAL EVALUATION ADULT. - OTHER INFO
patient reports with good PO intake PTA. tolerating full fluids this AM. on respiratory treatment now. food preferences noted. weight stable at home. no known food allergies. patient reports with good PO intake PTA. tolerating full fluids this AM. on respiratory treatment now. food preferences noted. weight stable at home. no known food allergies. noted patient refused speech evaluation at this time

## 2019-08-14 NOTE — CONSULT NOTE ADULT - SUBJECTIVE AND OBJECTIVE BOX
CHIEF COMPLAINT:  88y Female with chief complaint of found to have urine retention on bladder scan >300cc   joy placed         HISTORY OF PRESENT ILLNESS:  89 yo f , hx of Hypertension , osteoarthritis , osteoporosis hld , weakness , falls , recent SAH , hx of breast ca ,  and injured left hand and in soft cast , was in rehab , c/o chest pain and transferred to er , vital signs stable , and labs stable , denies cp , also ? recent uti , and bladder issues of nocturia treated with Oxybutynin , in er noted to have infiltrate on cxr and admitted for pneumonia on iv abx ,   patient has hcp and molst with dnr and dni , and getting ortho eval , abx for uti , dvt and gi prophylax , and repeat x rays of hands and also repeat mri brain , patient placed on seizure prophylaxis post recent SAH   on 8/13/19 , patient stable , numc called and neuro noted , ct head and SAH stable ,   urinary retention , foly placed asked for  evaluation and was started on  flomax ,   pneumonia on iv abx ,      *************************************************************************************************  PAST MEDICAL & SURGICAL HISTORY:  Hyperlipidemia  Diabetes mellitus  Breast Cancer  No significant past surgical history      MEDICATIONS  (STANDING):  cyanocobalamin 1000 MICROGram(s) Oral daily  dextrose 5% + sodium chloride 0.9%. 1000 milliLiter(s) (80 mL/Hr) IV Continuous <Continuous>  dextrose 5%. 1000 milliLiter(s) (50 mL/Hr) IV Continuous <Continuous>  dextrose 50% Injectable 12.5 Gram(s) IV Push once  dextrose 50% Injectable 25 Gram(s) IV Push once  dextrose 50% Injectable 25 Gram(s) IV Push once  insulin lispro (HumaLOG) corrective regimen sliding scale   SubCutaneous three times a day before meals  letrozole 2.5 milliGRAM(s) Oral daily  levETIRAcetam 250 milliGRAM(s) Oral two times a day  piperacillin/tazobactam IVPB.. 3.375 Gram(s) IV Intermittent every 12 hours  pyridoxine 50 milliGRAM(s) Oral daily    MEDICATIONS  (PRN):  acetaminophen   Tablet .. 650 milliGRAM(s) Oral every 4 hours PRN Moderate Pain (4 - 6), Severe Pain (7 - 10)  acetaminophen   Tablet .. 650 milliGRAM(s) Oral every 6 hours PRN Temp greater or equal to 38C (100.4F), Mild Pain (1 - 3)  dextrose 40% Gel 15 Gram(s) Oral once PRN Blood Glucose LESS THAN 70 milliGRAM(s)/deciliter  glucagon  Injectable 1 milliGRAM(s) IntraMuscular once PRN Glucose LESS THAN 70 milligrams/deciliter      ALLERGIES:  No Known Allergies      SOCIAL HISTORY:          ETOH:                                  Smoking:                                   Drugs:                                         Occupation:    FAMILY HISTORY:      CONSTITUTIONAL: No weakness, fevers or chills    EYES/ENT: No visual changes;  No vertigo or throat pain     NECK: No pain or stiffness    RESPIRATORY: No cough, wheezing, hemoptysis; No shortness of breath    CARDIOVASCULAR: No chest pain or palpitations    GASTROINTESTINAL: No abdominal or epigastric pain. No nausea, vomiting, or hematemesis; No diarrhea or constipation. No melena or hematochezia.    GENITOURINARY: nocturia     NEUROLOGICAL: No numbness or weakness    SKIN: No itching, burning, rashes, or lesions     All other review of systems is negative unless indicated above.    ****************************************************************************************************  PHYSICAL EXAM:    Vital Signs Last 24 Hrs  T(C): 37.2 (14 Aug 2019 07:32), Max: 37.7 (13 Aug 2019 15:59)  T(F): 98.9 (14 Aug 2019 07:32), Max: 99.8 (13 Aug 2019 15:59)  HR: 81 (14 Aug 2019 07:32) (73 - 88)  BP: 146/80 (14 Aug 2019 07:32) (121/77 - 150/83)  BP(mean): --  RR: 20 (14 Aug 2019 07:32) (18 - 20)  SpO2: 94% (14 Aug 2019 07:32) (93% - 95%)    GENERAL: alert with vent mask     ABDOMEN: soft  has joy urine drains clear     BACK: no cva tenderness     LOWER EXTREMITIES: in soft supports     NEUROLOGICAL:      *******************************************************************************************************  LABS:                        11.4   8.64  )-----------( 103      ( 13 Aug 2019 05:08 )             36.7     08-13    143  |  109<H>  |  13  ----------------------------<  139<H>  3.6   |  28  |  0.45<L>    Ca    8.6      13 Aug 2019 05:08          Urine Culture: 08-12 @ 08:54  Urine Culure Results   <10,000 CFU/mL Normal Urogenital Shanae  Organism --      RADIOLOGY & ADDITIONAL STUDIES:      *********************************************************************************************************  IMPRESSION: hx unstable bladder with oxybutynin use  presently not on oxybutynin   pneumonia uti   PLAN: started flomax  has joy   repeat voiding trial when more mobile CHIEF COMPLAINT:  88y Female with chief complaint of found to have urine retention on bladder scan >300cc   joy placed         HISTORY OF PRESENT ILLNESS:  89 yo f , hx of Hypertension , osteoarthritis , osteoporosis hld , weakness , falls , recent SAH , hx of breast ca ,  and injured left hand and in soft cast , was in rehab , c/o chest pain and transferred to er , vital signs stable , and labs stable , denies cp , also ? recent uti , and bladder issues of nocturia treated with Oxybutynin , in er noted to have infiltrate on cxr and admitted for pneumonia on iv abx ,   patient has hcp and molst with dnr and dni , and getting ortho eval , abx for uti , dvt and gi prophylax , and repeat x rays of hands and also repeat mri brain , patient placed on seizure prophylaxis post recent SAH   on 8/13/19 , patient stable , numc called and neuro noted , ct head and SAH stable ,   urinary retention , foly placed asked for  evaluation and was started on  flomax ,   pneumonia on iv abx ,      *************************************************************************************************  PAST MEDICAL & SURGICAL HISTORY:  Hyperlipidemia  Diabetes mellitus  Breast Cancer  No significant past surgical history      MEDICATIONS  (STANDING):  cyanocobalamin 1000 MICROGram(s) Oral daily  dextrose 5% + sodium chloride 0.9%. 1000 milliLiter(s) (80 mL/Hr) IV Continuous <Continuous>  dextrose 5%. 1000 milliLiter(s) (50 mL/Hr) IV Continuous <Continuous>  dextrose 50% Injectable 12.5 Gram(s) IV Push once  dextrose 50% Injectable 25 Gram(s) IV Push once  dextrose 50% Injectable 25 Gram(s) IV Push once  insulin lispro (HumaLOG) corrective regimen sliding scale   SubCutaneous three times a day before meals  letrozole 2.5 milliGRAM(s) Oral daily  levETIRAcetam 250 milliGRAM(s) Oral two times a day  piperacillin/tazobactam IVPB.. 3.375 Gram(s) IV Intermittent every 12 hours  pyridoxine 50 milliGRAM(s) Oral daily    MEDICATIONS  (PRN):  acetaminophen   Tablet .. 650 milliGRAM(s) Oral every 4 hours PRN Moderate Pain (4 - 6), Severe Pain (7 - 10)  acetaminophen   Tablet .. 650 milliGRAM(s) Oral every 6 hours PRN Temp greater or equal to 38C (100.4F), Mild Pain (1 - 3)  dextrose 40% Gel 15 Gram(s) Oral once PRN Blood Glucose LESS THAN 70 milliGRAM(s)/deciliter  glucagon  Injectable 1 milliGRAM(s) IntraMuscular once PRN Glucose LESS THAN 70 milligrams/deciliter      ALLERGIES:  No Known Allergies      SOCIAL HISTORY:          ETOH:                                  Smoking:                                   Drugs:                                         Occupation:    FAMILY HISTORY:      CONSTITUTIONAL: No weakness, fevers or chills    EYES/ENT: No visual changes;  No vertigo or throat pain     NECK: No pain or stiffness    RESPIRATORY: No cough, wheezing, hemoptysis; No shortness of breath    CARDIOVASCULAR: No chest pain or palpitations    GASTROINTESTINAL: No abdominal or epigastric pain. No nausea, vomiting, or hematemesis; No diarrhea or constipation. No melena or hematochezia.    GENITOURINARY: nocturia     NEUROLOGICAL: No numbness or weakness    SKIN: No itching, burning, rashes, or lesions     All other review of systems is negative unless indicated above.    ****************************************************************************************************  PHYSICAL EXAM:    Vital Signs Last 24 Hrs  T(C): 37.2 (14 Aug 2019 07:32), Max: 37.7 (13 Aug 2019 15:59)  T(F): 98.9 (14 Aug 2019 07:32), Max: 99.8 (13 Aug 2019 15:59)  HR: 81 (14 Aug 2019 07:32) (73 - 88)  BP: 146/80 (14 Aug 2019 07:32) (121/77 - 150/83)  BP(mean): --  RR: 20 (14 Aug 2019 07:32) (18 - 20)  SpO2: 94% (14 Aug 2019 07:32) (93% - 95%)    GENERAL: alert with vent mask     ABDOMEN: soft  has joy urine drains clear     BACK: no cva tenderness     LOWER EXTREMITIES: in soft supports     NEUROLOGICAL:      *******************************************************************************************************  LABS:                        11.4   8.64  )-----------( 103      ( 13 Aug 2019 05:08 )             36.7     08-13    143  |  109<H>  |  13  ----------------------------<  139<H>  3.6   |  28  |  0.45<L>    Ca    8.6      13 Aug 2019 05:08          Urine Culture: 08-12 @ 08:54  Urine Culure Results   <10,000 CFU/mL Normal Urogenital Shanae  Organism --  Urine Microscopic-Add On (NC) (08.12.19 @ 05:41)    Uric Acid Crystals: Few    Red Blood Cell - Urine: 11-25 /HPF    White Blood Cell - Urine: 0-2    Bacteria: Few    Epithelial Cells: Occasional        RADIOLOGY & ADDITIONAL STUDIES:      *********************************************************************************************************  IMPRESSION: hx unstable bladder nocturia  with oxybutynin use in past  presently not on oxybutynin   pneumonia  microhematuria    PLAN: started flomax  has joy   repeat voiding trial when more mobile   renal and bladder sonogram with joy clamped for 2 hours to fill bladder CHIEF COMPLAINT:  88y Female with chief complaint of found to have urine retention on bladder scan >300cc   joy placed         HISTORY OF PRESENT ILLNESS:  87 yo f , hx of Hypertension , osteoarthritis , osteoporosis hld , weakness , falls , recent SAH , hx of breast ca ,  and injured left hand and in soft cast , was in rehab , c/o chest pain and transferred to er , vital signs stable , and labs stable , denies cp , also ? recent uti , and bladder issues of nocturia treated with Oxybutynin , in er noted to have infiltrate on cxr and admitted for pneumonia on iv abx ,   patient has hcp and molst with dnr and dni , and getting ortho eval , abx for uti , dvt and gi prophylax , and repeat x rays of hands and also repeat mri brain , patient placed on seizure prophylaxis post recent SAH   on 8/13/19 , patient stable , numc called and neuro noted , ct head and SAH stable ,   urinary retention , foly placed asked for  evaluation and was not started on  flomax as per orders  ,   pneumonia on iv abx ,      *************************************************************************************************  PAST MEDICAL & SURGICAL HISTORY:  Hyperlipidemia  Diabetes mellitus  Breast Cancer  No significant past surgical history      MEDICATIONS  (STANDING):  cyanocobalamin 1000 MICROGram(s) Oral daily  dextrose 5% + sodium chloride 0.9%. 1000 milliLiter(s) (80 mL/Hr) IV Continuous <Continuous>  dextrose 5%. 1000 milliLiter(s) (50 mL/Hr) IV Continuous <Continuous>  dextrose 50% Injectable 12.5 Gram(s) IV Push once  dextrose 50% Injectable 25 Gram(s) IV Push once  dextrose 50% Injectable 25 Gram(s) IV Push once  insulin lispro (HumaLOG) corrective regimen sliding scale   SubCutaneous three times a day before meals  letrozole 2.5 milliGRAM(s) Oral daily  levETIRAcetam 250 milliGRAM(s) Oral two times a day  piperacillin/tazobactam IVPB.. 3.375 Gram(s) IV Intermittent every 12 hours  pyridoxine 50 milliGRAM(s) Oral daily    MEDICATIONS  (PRN):  acetaminophen   Tablet .. 650 milliGRAM(s) Oral every 4 hours PRN Moderate Pain (4 - 6), Severe Pain (7 - 10)  acetaminophen   Tablet .. 650 milliGRAM(s) Oral every 6 hours PRN Temp greater or equal to 38C (100.4F), Mild Pain (1 - 3)  dextrose 40% Gel 15 Gram(s) Oral once PRN Blood Glucose LESS THAN 70 milliGRAM(s)/deciliter  glucagon  Injectable 1 milliGRAM(s) IntraMuscular once PRN Glucose LESS THAN 70 milligrams/deciliter      ALLERGIES:  No Known Allergies      SOCIAL HISTORY:          ETOH:                                  Smoking:                                   Drugs:                                         Occupation:    FAMILY HISTORY:      CONSTITUTIONAL: No weakness, fevers or chills    EYES/ENT: No visual changes;  No vertigo or throat pain     NECK: No pain or stiffness    RESPIRATORY: No cough, wheezing, hemoptysis; No shortness of breath    CARDIOVASCULAR: No chest pain or palpitations    GASTROINTESTINAL: No abdominal or epigastric pain. No nausea, vomiting, or hematemesis; No diarrhea or constipation. No melena or hematochezia.    GENITOURINARY: nocturia     NEUROLOGICAL: No numbness or weakness    SKIN: No itching, burning, rashes, or lesions     All other review of systems is negative unless indicated above.    ****************************************************************************************************  PHYSICAL EXAM:    Vital Signs Last 24 Hrs  T(C): 37.2 (14 Aug 2019 07:32), Max: 37.7 (13 Aug 2019 15:59)  T(F): 98.9 (14 Aug 2019 07:32), Max: 99.8 (13 Aug 2019 15:59)  HR: 81 (14 Aug 2019 07:32) (73 - 88)  BP: 146/80 (14 Aug 2019 07:32) (121/77 - 150/83)  BP(mean): --  RR: 20 (14 Aug 2019 07:32) (18 - 20)  SpO2: 94% (14 Aug 2019 07:32) (93% - 95%)    GENERAL: alert with vent mask     ABDOMEN: soft  has joy urine drains clear     BACK: no cva tenderness     LOWER EXTREMITIES: in soft supports     NEUROLOGICAL:      *******************************************************************************************************  LABS:                        11.4   8.64  )-----------( 103      ( 13 Aug 2019 05:08 )             36.7     08-13    143  |  109<H>  |  13  ----------------------------<  139<H>  3.6   |  28  |  0.45<L>    Ca    8.6      13 Aug 2019 05:08          Urine Culture: 08-12 @ 08:54  Urine Culure Results   <10,000 CFU/mL Normal Urogenital Shanae  Organism --  Urine Microscopic-Add On (NC) (08.12.19 @ 05:41)    Uric Acid Crystals: Few    Red Blood Cell - Urine: 11-25 /HPF    White Blood Cell - Urine: 0-2    Bacteria: Few    Epithelial Cells: Occasional        RADIOLOGY & ADDITIONAL STUDIES:      *********************************************************************************************************  IMPRESSION: hx unstable bladder nocturia  with oxybutynin use in past  presently not on oxybutynin   pneumonia  microhematuria    PLAN: started flomax  has joy   repeat voiding trial when more mobile   renal and bladder sonogram with joy clamped for 2 hours to fill bladder

## 2019-08-15 DIAGNOSIS — R33.9 RETENTION OF URINE, UNSPECIFIED: ICD-10-CM

## 2019-08-15 DIAGNOSIS — R79.89 OTHER SPECIFIED ABNORMAL FINDINGS OF BLOOD CHEMISTRY: ICD-10-CM

## 2019-08-15 LAB
CULTURE RESULTS: SIGNIFICANT CHANGE UP
SPECIMEN SOURCE: SIGNIFICANT CHANGE UP

## 2019-08-15 PROCEDURE — 71045 X-RAY EXAM CHEST 1 VIEW: CPT | Mod: 26

## 2019-08-15 RX ADMIN — LETROZOLE 2.5 MILLIGRAM(S): 2.5 TABLET, FILM COATED ORAL at 12:13

## 2019-08-15 RX ADMIN — TAMSULOSIN HYDROCHLORIDE 0.4 MILLIGRAM(S): 0.4 CAPSULE ORAL at 21:55

## 2019-08-15 RX ADMIN — Medication 50 MILLIGRAM(S): at 12:13

## 2019-08-15 RX ADMIN — PIPERACILLIN AND TAZOBACTAM 25 GRAM(S): 4; .5 INJECTION, POWDER, LYOPHILIZED, FOR SOLUTION INTRAVENOUS at 17:54

## 2019-08-15 RX ADMIN — SODIUM CHLORIDE 80 MILLILITER(S): 9 INJECTION, SOLUTION INTRAVENOUS at 01:53

## 2019-08-15 RX ADMIN — PIPERACILLIN AND TAZOBACTAM 25 GRAM(S): 4; .5 INJECTION, POWDER, LYOPHILIZED, FOR SOLUTION INTRAVENOUS at 05:25

## 2019-08-15 RX ADMIN — PREGABALIN 1000 MICROGRAM(S): 225 CAPSULE ORAL at 12:13

## 2019-08-15 RX ADMIN — Medication 650 MILLIGRAM(S): at 21:58

## 2019-08-15 RX ADMIN — LEVETIRACETAM 250 MILLIGRAM(S): 250 TABLET, FILM COATED ORAL at 05:25

## 2019-08-15 RX ADMIN — Medication 650 MILLIGRAM(S): at 22:00

## 2019-08-15 NOTE — GOALS OF CARE CONVERSATION - PERSONAL ADVANCE DIRECTIVE - NS PRO AD PATIENT TYPE ON CHART
Health Care Proxy (HCP)/Jamar Abbott [phone] son HCP/Medical Orders for Life-Sustaining Treatment (MOLST)

## 2019-08-15 NOTE — PROGRESS NOTE ADULT - ASSESSMENT
88f with dm, hld, sah, urinary retention, left wrist fx  admitted leukocytosis, tachy cardia, tachypnea  found to have rll pna

## 2019-08-16 PROCEDURE — 74176 CT ABD & PELVIS W/O CONTRAST: CPT | Mod: 26

## 2019-08-16 RX ADMIN — PIPERACILLIN AND TAZOBACTAM 25 GRAM(S): 4; .5 INJECTION, POWDER, LYOPHILIZED, FOR SOLUTION INTRAVENOUS at 18:32

## 2019-08-16 RX ADMIN — Medication 50 MILLIGRAM(S): at 11:54

## 2019-08-16 RX ADMIN — Medication 650 MILLIGRAM(S): at 16:35

## 2019-08-16 RX ADMIN — Medication 650 MILLIGRAM(S): at 22:30

## 2019-08-16 RX ADMIN — LETROZOLE 2.5 MILLIGRAM(S): 2.5 TABLET, FILM COATED ORAL at 11:54

## 2019-08-16 RX ADMIN — Medication 2: at 08:07

## 2019-08-16 RX ADMIN — TAMSULOSIN HYDROCHLORIDE 0.4 MILLIGRAM(S): 0.4 CAPSULE ORAL at 21:20

## 2019-08-16 RX ADMIN — PIPERACILLIN AND TAZOBACTAM 25 GRAM(S): 4; .5 INJECTION, POWDER, LYOPHILIZED, FOR SOLUTION INTRAVENOUS at 05:35

## 2019-08-16 RX ADMIN — PREGABALIN 1000 MICROGRAM(S): 225 CAPSULE ORAL at 11:54

## 2019-08-16 RX ADMIN — Medication 650 MILLIGRAM(S): at 17:30

## 2019-08-16 RX ADMIN — Medication 650 MILLIGRAM(S): at 21:19

## 2019-08-17 RX ADMIN — Medication 50 MILLIGRAM(S): at 11:36

## 2019-08-17 RX ADMIN — PIPERACILLIN AND TAZOBACTAM 25 GRAM(S): 4; .5 INJECTION, POWDER, LYOPHILIZED, FOR SOLUTION INTRAVENOUS at 19:00

## 2019-08-17 RX ADMIN — LETROZOLE 2.5 MILLIGRAM(S): 2.5 TABLET, FILM COATED ORAL at 11:36

## 2019-08-17 RX ADMIN — PREGABALIN 1000 MICROGRAM(S): 225 CAPSULE ORAL at 11:36

## 2019-08-17 RX ADMIN — TAMSULOSIN HYDROCHLORIDE 0.4 MILLIGRAM(S): 0.4 CAPSULE ORAL at 21:12

## 2019-08-17 RX ADMIN — PIPERACILLIN AND TAZOBACTAM 25 GRAM(S): 4; .5 INJECTION, POWDER, LYOPHILIZED, FOR SOLUTION INTRAVENOUS at 05:46

## 2019-08-18 LAB
ANION GAP SERPL CALC-SCNC: 7 MMOL/L — SIGNIFICANT CHANGE UP (ref 5–17)
BUN SERPL-MCNC: 10 MG/DL — SIGNIFICANT CHANGE UP (ref 7–23)
CALCIUM SERPL-MCNC: 8.5 MG/DL — SIGNIFICANT CHANGE UP (ref 8.5–10.1)
CHLORIDE SERPL-SCNC: 102 MMOL/L — SIGNIFICANT CHANGE UP (ref 96–108)
CO2 SERPL-SCNC: 33 MMOL/L — HIGH (ref 22–31)
CREAT SERPL-MCNC: 0.37 MG/DL — LOW (ref 0.5–1.3)
GLUCOSE SERPL-MCNC: 91 MG/DL — SIGNIFICANT CHANGE UP (ref 70–99)
POTASSIUM SERPL-MCNC: 2.6 MMOL/L — CRITICAL LOW (ref 3.5–5.3)
POTASSIUM SERPL-SCNC: 2.6 MMOL/L — CRITICAL LOW (ref 3.5–5.3)
SODIUM SERPL-SCNC: 142 MMOL/L — SIGNIFICANT CHANGE UP (ref 135–145)

## 2019-08-18 RX ORDER — POTASSIUM CHLORIDE 20 MEQ
40 PACKET (EA) ORAL EVERY 4 HOURS
Refills: 0 | Status: COMPLETED | OUTPATIENT
Start: 2019-08-18 | End: 2019-08-18

## 2019-08-18 RX ADMIN — Medication 650 MILLIGRAM(S): at 03:55

## 2019-08-18 RX ADMIN — Medication 40 MILLIEQUIVALENT(S): at 12:05

## 2019-08-18 RX ADMIN — Medication 650 MILLIGRAM(S): at 22:30

## 2019-08-18 RX ADMIN — Medication 50 MILLIGRAM(S): at 12:03

## 2019-08-18 RX ADMIN — LETROZOLE 2.5 MILLIGRAM(S): 2.5 TABLET, FILM COATED ORAL at 12:03

## 2019-08-18 RX ADMIN — Medication 650 MILLIGRAM(S): at 02:26

## 2019-08-18 RX ADMIN — PREGABALIN 1000 MICROGRAM(S): 225 CAPSULE ORAL at 12:03

## 2019-08-18 RX ADMIN — TAMSULOSIN HYDROCHLORIDE 0.4 MILLIGRAM(S): 0.4 CAPSULE ORAL at 21:45

## 2019-08-18 RX ADMIN — Medication 650 MILLIGRAM(S): at 21:39

## 2019-08-18 RX ADMIN — Medication 40 MILLIEQUIVALENT(S): at 06:51

## 2019-08-18 RX ADMIN — Medication 40 MILLIEQUIVALENT(S): at 17:48

## 2019-08-18 RX ADMIN — PIPERACILLIN AND TAZOBACTAM 25 GRAM(S): 4; .5 INJECTION, POWDER, LYOPHILIZED, FOR SOLUTION INTRAVENOUS at 05:12

## 2019-08-18 NOTE — PHARMACOTHERAPY INTERVENTION NOTE - COMMENTS
pt with pna on zosyn, ID recommended 7 days abx therapy, s/w Dr Mariscal, recommended discontinuing abx as 7 day course has been completed

## 2019-08-19 ENCOUNTER — TRANSCRIPTION ENCOUNTER (OUTPATIENT)
Age: 84
End: 2019-08-19

## 2019-08-19 LAB
ANION GAP SERPL CALC-SCNC: 7 MMOL/L — SIGNIFICANT CHANGE UP (ref 5–17)
BUN SERPL-MCNC: 10 MG/DL — SIGNIFICANT CHANGE UP (ref 7–23)
CALCIUM SERPL-MCNC: 8.2 MG/DL — LOW (ref 8.5–10.1)
CHLORIDE SERPL-SCNC: 102 MMOL/L — SIGNIFICANT CHANGE UP (ref 96–108)
CO2 SERPL-SCNC: 34 MMOL/L — HIGH (ref 22–31)
CREAT SERPL-MCNC: 0.37 MG/DL — LOW (ref 0.5–1.3)
GLUCOSE SERPL-MCNC: 89 MG/DL — SIGNIFICANT CHANGE UP (ref 70–99)
MAGNESIUM SERPL-MCNC: 1.2 MG/DL — LOW (ref 1.6–2.6)
POTASSIUM SERPL-MCNC: 3.7 MMOL/L — SIGNIFICANT CHANGE UP (ref 3.5–5.3)
POTASSIUM SERPL-SCNC: 3.7 MMOL/L — SIGNIFICANT CHANGE UP (ref 3.5–5.3)
SODIUM SERPL-SCNC: 143 MMOL/L — SIGNIFICANT CHANGE UP (ref 135–145)

## 2019-08-19 RX ORDER — LANOLIN ALCOHOL/MO/W.PET/CERES
5 CREAM (GRAM) TOPICAL AT BEDTIME
Refills: 0 | Status: DISCONTINUED | OUTPATIENT
Start: 2019-08-19 | End: 2019-08-20

## 2019-08-19 RX ORDER — LEVETIRACETAM 250 MG/1
1 TABLET, FILM COATED ORAL
Qty: 0 | Refills: 0 | DISCHARGE

## 2019-08-19 RX ORDER — PREGABALIN 225 MG/1
1 CAPSULE ORAL
Qty: 0 | Refills: 0 | DISCHARGE

## 2019-08-19 RX ORDER — LANOLIN ALCOHOL/MO/W.PET/CERES
1 CREAM (GRAM) TOPICAL
Qty: 0 | Refills: 0 | DISCHARGE
Start: 2019-08-19

## 2019-08-19 RX ORDER — INSULIN LISPRO 100/ML
0 VIAL (ML) SUBCUTANEOUS
Qty: 0 | Refills: 0 | DISCHARGE
Start: 2019-08-19

## 2019-08-19 RX ORDER — ACETAMINOPHEN 500 MG
2 TABLET ORAL
Qty: 0 | Refills: 0 | DISCHARGE
Start: 2019-08-19

## 2019-08-19 RX ORDER — TAMSULOSIN HYDROCHLORIDE 0.4 MG/1
1 CAPSULE ORAL
Qty: 0 | Refills: 0 | DISCHARGE
Start: 2019-08-19

## 2019-08-19 RX ADMIN — Medication 650 MILLIGRAM(S): at 21:44

## 2019-08-19 RX ADMIN — PREGABALIN 1000 MICROGRAM(S): 225 CAPSULE ORAL at 13:37

## 2019-08-19 RX ADMIN — Medication 50 MILLIGRAM(S): at 13:37

## 2019-08-19 RX ADMIN — Medication 5 MILLIGRAM(S): at 01:28

## 2019-08-19 RX ADMIN — LETROZOLE 2.5 MILLIGRAM(S): 2.5 TABLET, FILM COATED ORAL at 13:38

## 2019-08-19 NOTE — DISCHARGE NOTE PROVIDER - NSDCFUADDAPPT_GEN_ALL_CORE_FT
follow up ortho for left hand and finger wrist fx , and right tumb fx , in few weeks , at [facility] ortho department

## 2019-08-19 NOTE — PROGRESS NOTE ADULT - ASSESSMENT
87 yo f , hx of Hypertension , osteoarthritis , osteoporosis hld , weakness , falls , recent SAH , hx of breast ca ,  and inured left hand and in soft cast , was in rehab , c/o chest pain and transferred to er , vital signs stable , and labs stable , denies cp , also ? recent uti , and bladder issues , in er noted to have infiltrate on cxr and admitted for pneumonia on iv abx ,   patient has hcp and molst with dnr and dni , and will get ortho eval , abx for utio , dvt and gi prophylax , and repeat x rays of hands and also repeat mri brain , patient placed on seizure prophylaxis post recent SAH   on 8/13/19 , patient stable , numc called and neuro noted , ct head and SAH stable ,   urinary retention , foly placed get gu eval , start flomax ,   pneumonia on iv abx ,   case d/w son , advance directives reviewed ,   on 8/14/19 patient stable , depressed ,   ID pneumonia finish abx till 8/17/19,   urinary retention ,  noted , flomax , dc and void trial when more mobile ,    neuro , taper anti seizure meds , , dc to rehab soon   fall and hand fx , ortho plastic hand surgeon noted , keep splint repeat xrays in few weeks and f/u as out patient   on 8/19/19 patient stable , awake ,vss, foly dc per gu , seen by hand specialist follow up xrays in 3-4 weeks and follow up , post abx for pneumonia , advance diet , plan dc to rehab in am

## 2019-08-19 NOTE — DISCHARGE NOTE PROVIDER - CARE PROVIDER_API CALL
Naveen King)  Internal Medicine  [address], [address]  [place] [address]  Phone: [phone]  Fax: [phone]  Follow Up Time:

## 2019-08-19 NOTE — DISCHARGE NOTE PROVIDER - HOSPITAL COURSE
· Assessment        87 yo f , hx of Hypertension , osteoarthritis , osteoporosis hld , weakness , falls , recent SAH , hx of breast ca ,  and inured left hand and in soft cast , was in rehab , c/o chest pain and transferred to er , vital signs stable , and labs stable , denies cp , also ? recent uti , and bladder issues , in er noted to have infiltrate on cxr and admitted for pneumonia on iv abx ,     patient has hcp and molst with dnr and dni , and will get ortho eval , abx for utio , dvt and gi prophylax , and repeat x rays of hands and also repeat mri brain , patient placed on seizure prophylaxis post recent SAH     on 8/13/19 , patient stable , numc called and neuro noted , ct head and SAH stable ,     urinary retention , foly placed get gu eval , start flomax ,     pneumonia on iv abx ,     case d/w son , advance directives reviewed ,     on 8/14/19 patient stable , depressed ,     ID pneumonia finish abx till 8/17/19,     urinary retention ,  noted , flomax , dc and void trial when more mobile ,      neuro , taper anti seizure meds , , dc to rehab soon     fall and hand fx , ortho plastic hand surgeon noted , keep splint repeat xrays in few weeks and f/u as out patient     on 8/19/19 patient stable , awake ,vss, foly dc per gu , seen by hand specialist follow up xrays in 3-4 weeks and follow up , post abx for pneumonia , advance diet , plan dc to rehab in am          Problem/Plan - 1:    ·  Problem: Pneumonia of right lower lobe due to infectious organism.  Plan: iv abx , and oxygen.          Problem/Plan - 2:    ·  Problem: Type 2 diabetes mellitus with other diabetic arthropathy, without long-term current use of insulin.  Plan: fs and coverage.          Problem/Plan - 3:    ·  Problem: Hyperlipidemia, unspecified hyperlipidemia type.  Plan: simvasatin.          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  Plan: letrizol.          Problem/Plan - 5:    ·  Problem: Subarachnoid hematoma, without loss of consciousness, subsequent encounter.  Plan: repeat mri.

## 2019-08-19 NOTE — DISCHARGE NOTE PROVIDER - NSDCCPCAREPLAN_GEN_ALL_CORE_FT
PRINCIPAL DISCHARGE DIAGNOSIS  Diagnosis: Pneumonia of right lower lobe due to infectious organism  Assessment and Plan of Treatment:

## 2019-08-19 NOTE — CHART NOTE - NSCHARTNOTEFT_GEN_A_CORE
Assessment: Patient seen for nutrition follow up. Chart reviewed, hospital course noted.     Patient alert, on venti-mask. [place] but answering questions appropriately. Patient noted to be on full liquid diet since admission. Had episode of diarrhea yesterday (x4 loose BMs), K noted to be 2.6 and repleted. Low Mg today. Unclear why patient has been on full liquid diet since admission. Per chart review, patient unwilling to participate in Speech evaluation 8/13. Per transfer papers state patient on dysphagia 2 mechanical soft with nectar thickened liquids PTA.  MOLST reviewed, DNI/DNR no TF. Will send items allowed on full liquid diet to help optimize caloric intake (yogurt, pureed soup) .     Factors impacting intake: [ ] none [ ] nausea  [ ] vomiting [ ] diarrhea [ ] constipation  [ ]chewing problems [ ] swallowing issues  [ ] other:     Diet, Consistent Carbohydrate Full Liquid (08-10-19 @ 11:13)    Intake: 10-50% po intake noted per flowsheets    Current Weight: Weight (kg): 85.6 (08-14 @ 04:49)  % Weight Change    Pertinent Medications: MEDICATIONS  (STANDING):  cyanocobalamin 1000 MICROGram(s) Oral daily  dextrose 5%. 1000 milliLiter(s) (50 mL/Hr) IV Continuous <Continuous>  dextrose 50% Injectable 12.5 Gram(s) IV Push once  dextrose 50% Injectable 25 Gram(s) IV Push once  dextrose 50% Injectable 25 Gram(s) IV Push once  insulin lispro (HumaLOG) corrective regimen sliding scale   SubCutaneous three times a day before meals  letrozole 2.5 milliGRAM(s) Oral daily  melatonin 5 milliGRAM(s) Oral at bedtime  pyridoxine 50 milliGRAM(s) Oral daily  tamsulosin 0.4 milliGRAM(s) Oral at bedtime    MEDICATIONS  (PRN):  acetaminophen   Tablet .. 650 milliGRAM(s) Oral every 4 hours PRN Moderate Pain (4 - 6), Severe Pain (7 - 10)  acetaminophen   Tablet .. 650 milliGRAM(s) Oral every 6 hours PRN Temp greater or equal to 38C (100.4F), Mild Pain (1 - 3)  dextrose 40% Gel 15 Gram(s) Oral once PRN Blood Glucose LESS THAN 70 milliGRAM(s)/deciliter  glucagon  Injectable 1 milliGRAM(s) IntraMuscular once PRN Glucose LESS THAN 70 milligrams/deciliter    Pertinent Labs: 08-19 Na143 mmol/L Glu 89 mg/dL K+ 3.7 mmol/L Cr  0.37 mg/dL<L> BUN 10 mg/dL 08-11 [id number] 6.9 %<H>     CAPILLARY BLOOD GLUCOSE      POCT Blood Glucose.: 94 mg/dL (19 Aug 2019 07:49)  POCT Blood Glucose.: 91 mg/dL (18 Aug 2019 21:24)  POCT Blood Glucose.: 100 mg/dL (18 Aug 2019 16:23)  POCT Blood Glucose.: 76 mg/dL (18 Aug 2019 11:11)    Skin: L sacrum suspected DTI.     Estimated Needs:   [X] no change since previous assessment  [ ] recalculated:     Previous Nutrition Diagnosis:   No active nutrition diagnosis.    Nutrition Diagnosis is [ ] ongoing  [ ] resolved [X] not applicable     New Nutrition Diagnosis: [X] Inadequate energy intake related to prolonged status on full liquid diet with variable intake (10-50%) as evidenced by patient meeting ~50% of estimated nutrient needs via current diet and intake.      Interventions:   Recommend  [X] Change Diet To: Dysphagia 2 mechanical soft with nectar thickened liquids, consistent carbohydrate per previous diet at facility. Call made out to MD King but unable to reach at this time. Nurse made aware of prolonged full liquid diet status. Repeat SLP evaluation as patient appears to be more willing to participate at this time.  [X] Nutrition Supplement: Glucerna BID to assist with po intake.  [ ] Nutrition Support  [X] Other: Upgrade diet to facilitate with patient meeting estimated nutrient needs. Monitor bowel movements (frequency and consistency), replete electrolytes prn.    Monitoring and Evaluation:   [X] PO intake [ x ] Tolerance to diet prescription [ x ] weights [ x ] labs[ x ] follow up per protocol  [ ] other:

## 2019-08-20 ENCOUNTER — TRANSCRIPTION ENCOUNTER (OUTPATIENT)
Age: 84
End: 2019-08-20

## 2019-08-20 VITALS
RESPIRATION RATE: 16 BRPM | TEMPERATURE: 98 F | HEART RATE: 84 BPM | DIASTOLIC BLOOD PRESSURE: 78 MMHG | SYSTOLIC BLOOD PRESSURE: 146 MMHG | OXYGEN SATURATION: 92 %

## 2019-08-20 PROCEDURE — 99285 EMERGENCY DEPT VISIT HI MDM: CPT | Mod: 25

## 2019-08-20 PROCEDURE — 85730 THROMBOPLASTIN TIME PARTIAL: CPT

## 2019-08-20 PROCEDURE — 82962 GLUCOSE BLOOD TEST: CPT

## 2019-08-20 PROCEDURE — 87150 DNA/RNA AMPLIFIED PROBE: CPT

## 2019-08-20 PROCEDURE — 85027 COMPLETE CBC AUTOMATED: CPT

## 2019-08-20 PROCEDURE — 96367 TX/PROPH/DG ADDL SEQ IV INF: CPT

## 2019-08-20 PROCEDURE — 70450 CT HEAD/BRAIN W/O DYE: CPT

## 2019-08-20 PROCEDURE — 74176 CT ABD & PELVIS W/O CONTRAST: CPT

## 2019-08-20 PROCEDURE — 83735 ASSAY OF MAGNESIUM: CPT

## 2019-08-20 PROCEDURE — 97163 PT EVAL HIGH COMPLEX 45 MIN: CPT

## 2019-08-20 PROCEDURE — 71045 X-RAY EXAM CHEST 1 VIEW: CPT

## 2019-08-20 PROCEDURE — 81001 URINALYSIS AUTO W/SCOPE: CPT

## 2019-08-20 PROCEDURE — 87040 BLOOD CULTURE FOR BACTERIA: CPT

## 2019-08-20 PROCEDURE — 93005 ELECTROCARDIOGRAM TRACING: CPT

## 2019-08-20 PROCEDURE — 85610 PROTHROMBIN TIME: CPT

## 2019-08-20 PROCEDURE — 87086 URINE CULTURE/COLONY COUNT: CPT

## 2019-08-20 PROCEDURE — 83036 HEMOGLOBIN GLYCOSYLATED A1C: CPT

## 2019-08-20 PROCEDURE — 96365 THER/PROPH/DIAG IV INF INIT: CPT

## 2019-08-20 PROCEDURE — 36415 COLL VENOUS BLD VENIPUNCTURE: CPT

## 2019-08-20 PROCEDURE — 76857 US EXAM PELVIC LIMITED: CPT

## 2019-08-20 PROCEDURE — 80048 BASIC METABOLIC PNL TOTAL CA: CPT

## 2019-08-20 PROCEDURE — 73110 X-RAY EXAM OF WRIST: CPT

## 2019-08-20 PROCEDURE — 73130 X-RAY EXAM OF HAND: CPT

## 2019-08-20 PROCEDURE — 76775 US EXAM ABDO BACK WALL LIM: CPT

## 2019-08-20 PROCEDURE — 97530 THERAPEUTIC ACTIVITIES: CPT

## 2019-08-20 PROCEDURE — 83605 ASSAY OF LACTIC ACID: CPT

## 2019-08-20 PROCEDURE — 97110 THERAPEUTIC EXERCISES: CPT

## 2019-08-20 RX ORDER — AMLODIPINE BESYLATE 2.5 MG/1
1 TABLET ORAL
Qty: 0 | Refills: 0 | DISCHARGE
Start: 2019-08-20

## 2019-08-20 RX ORDER — AMLODIPINE BESYLATE 2.5 MG/1
2.5 TABLET ORAL DAILY
Refills: 0 | Status: DISCONTINUED | OUTPATIENT
Start: 2019-08-20 | End: 2019-08-20

## 2019-08-20 RX ADMIN — LETROZOLE 2.5 MILLIGRAM(S): 2.5 TABLET, FILM COATED ORAL at 11:34

## 2019-08-20 RX ADMIN — PREGABALIN 1000 MICROGRAM(S): 225 CAPSULE ORAL at 11:34

## 2019-08-20 RX ADMIN — AMLODIPINE BESYLATE 2.5 MILLIGRAM(S): 2.5 TABLET ORAL at 16:28

## 2019-08-20 RX ADMIN — Medication 50 MILLIGRAM(S): at 11:34

## 2019-08-20 NOTE — PROGRESS NOTE ADULT - SUBJECTIVE AND OBJECTIVE BOX
Neurology Follow up note    FREEDOM [id number]    HPI:  pt  with increased  temp and  sob  transferred from nh to er  cxr  shows rll infiltrate and wbc  elevated (10 Aug 2019 11:04)      Interval History - no new events.    Patient is seen, chart was reviewed and case was discussed with the treatment team.  Pt is not in any distress.   Lying on bed comfortably.   No events reported overnight.   Sitting on chair bed comfortably.    is at bedside.    Vital Signs Last 24 Hrs  T(C): 36.7 (17 Aug 2019 07:42), Max: 37.2 (16 Aug 2019 15:59)  T(F): 98.1 (17 Aug 2019 07:42), Max: 98.9 (16 Aug 2019 15:59)  HR: 77 (17 Aug 2019 07:42) (77 - 88)  BP: 156/79 (17 Aug 2019 07:42) (156/79 - 178/91)  BP(mean): --  RR: 18 (17 Aug 2019 07:42) (18 - 20)  SpO2: 94% (17 Aug 2019 07:42) (92% - 94%)        REVIEW OF SYSTEMS:  limited not in any distress.    On Neurological Examination:    Mental Status - Pt is alert, awake, Follows commands     Speech -  dysarthria.    Cranial Nerves - Pupils 3 mm equal and reactive to light, extraocular eye movements intact. Pt has no visual field deficit.  Pt has no facial asymmetry. Facial sensation is intact.Tongue - is in midline.    Muscle tone - is normal    Motor Exam - 4/5 of ue.  le 3/5    Sensory Exam -. Pt withdraws all extremities equally on stimulation. No asymmetry seen. No complaints of tingling, numbness.      coordination:    Finger to nose: normal      Deep tendon Reflexes - 2 plus all over.     .    Neck Supple -  Yes.     MEDICATIONS    acetaminophen   Tablet .. 650 milliGRAM(s) Oral every 4 hours PRN  acetaminophen   Tablet .. 650 milliGRAM(s) Oral every 6 hours PRN  cyanocobalamin 1000 MICROGram(s) Oral daily  dextrose 40% Gel 15 Gram(s) Oral once PRN  dextrose 5%. 1000 milliLiter(s) IV Continuous <Continuous>  dextrose 50% Injectable 12.5 Gram(s) IV Push once  dextrose 50% Injectable 25 Gram(s) IV Push once  dextrose 50% Injectable 25 Gram(s) IV Push once  glucagon  Injectable 1 milliGRAM(s) IntraMuscular once PRN  insulin lispro (HumaLOG) corrective regimen sliding scale   SubCutaneous three times a day before meals  letrozole 2.5 milliGRAM(s) Oral daily  piperacillin/tazobactam IVPB.. 3.375 Gram(s) IV Intermittent every 12 hours  pyridoxine 50 milliGRAM(s) Oral daily  tamsulosin 0.4 milliGRAM(s) Oral at bedtime      Allergies    No Known Allergies    Intolerances        LABS:            Hemoglobin A1C:     Vitamin B12     RADIOLOGY    ASSESSMENT AND PLAN:      AMS IMPROVING.  HX OF SAH ; WAS ON KEEPRA.  NO KNOWN HX OF SEIZURE    AGREE WITH PLAN OF DC KEPPRA.  Physical therapy evaluation.  OOB to chair/ambulation with assistance only.  Pain is accessed and addressed.  Would continue to follow.
Neurology follow up note    FREEDOM [id number]      Interval History:    Patient feels ok no new complaints.    MEDICATIONS    acetaminophen   Tablet .. 650 milliGRAM(s) Oral every 4 hours PRN  acetaminophen   Tablet .. 650 milliGRAM(s) Oral every 6 hours PRN  cyanocobalamin 1000 MICROGram(s) Oral daily  dextrose 40% Gel 15 Gram(s) Oral once PRN  dextrose 5%. 1000 milliLiter(s) IV Continuous <Continuous>  dextrose 50% Injectable 12.5 Gram(s) IV Push once  dextrose 50% Injectable 25 Gram(s) IV Push once  dextrose 50% Injectable 25 Gram(s) IV Push once  glucagon  Injectable 1 milliGRAM(s) IntraMuscular once PRN  insulin lispro (HumaLOG) corrective regimen sliding scale   SubCutaneous three times a day before meals  letrozole 2.5 milliGRAM(s) Oral daily  melatonin 5 milliGRAM(s) Oral at bedtime  pyridoxine 50 milliGRAM(s) Oral daily  tamsulosin 0.4 milliGRAM(s) Oral at bedtime      Allergies    No Known Allergies    Intolerances            Vital Signs Last 24 Hrs  T(C): 36.8 (19 Aug 2019 07:39), Max: 37.3 (18 Aug 2019 16:09)  T(F): 98.2 (19 Aug 2019 07:39), Max: 99.1 (18 Aug 2019 16:09)  HR: 89 (19 Aug 2019 07:39) (89 - 92)  BP: 182/82 (19 Aug 2019 07:39) (152/84 - 182/82)  BP(mean): --  RR: 18 (19 Aug 2019 07:39) (18 - 18)  SpO2: 93% (19 Aug 2019 07:39) (93% - 95%)      REVIEW OF SYSTEMS:     Constitutional: No fever, chills, fatigue, generalized weakness  Eyes: no eye pain, visual disturbances, or discharge  ENT:  No difficulty hearing, tinnitus, vertigo; No sinus or throat pain  Neck: No pain or stiffness  Respiratory: No cough, dyspnea, wheezing h/o SOB better overall  Cardiovascular: No chest pain, palpitations,   Gastrointestinal: No abdominal or epigastric pain. No nausea, vomiting  No diarrhea or constipation.   Genitourinary: No dysuria, frequency, hematuria or incontinence  Neurological: No headaches, lightheadedness, vertigo, numbness or tremors  Psychiatric: No depression, anxiety, mood swings or difficulty sleeping  Musculoskeletal: No joint pain or swelling; No muscle, back or extremity pain  Skin: No itching, burning, rashes or lesions   Lymph Nodes: No enlarged glands  Endocrine: No heat or cold intolerance; No hair loss   Allergy and Immunologic: No hives or eczema    On Neurological Examination:    The patient is awake alert.    Extraocular movement appeared to be intact.    Left eye; subtle ptosis was noted, but there was swelling.    Pupils bilaterally were 3 mm, reactive to 2 mm.    Speech was fluent.  Motor:  Right upper had decreased range of motion of the shoulder, was able to elevate roughly 50 degrees and left 60 degrees, would say overall strength bilateral upper was 4/5.    The patient does have a cast on her left wrist and does have a thumb splint on her right hand.    Bilateral lower extremities were 3-/5. Surgical intervention, scar was noted on the right knee.      Follow simple commands    GENERAL Exam: Nontoxic , No Acute Distress   	  HEENT:  normocephalic, atraumatic  		  LUNGS:  Decreased bilaterally  	  HEART: Normal S1S2   No murmur RRR        	  GI/ ABDOMEN:  Soft  Non tender    EXTREMITIES:   No Edema  No Clubbing  No Cyanosis     MUSCULOSKELETAL: decreased Range of Motion all 4 extremities   	   SKIN: Normal  No Ecchymosis                    LABS:      08-19    143  |  102  |  10  ----------------------------<  89  3.7   |  34<H>  |  0.37<L>    Ca    8.2<L>      19 Aug 2019 06:24  Mg     1.2     08-19      Hemoglobin A1C:       Vitamin B12         RADIOLOGY    ANALYSIS AND PLAN:  This is an 88-year-old with an episode of change in mental status and history of subarachnoid hemorrhage.  1.	For change in mental status and lethargy, most likely secondary to metabolic encephalopathy and possibly underlying respiratory issues resolving   2.	Monitor respiratory status.  3.	Antibiotics as needed.  4.	For abnormal head CT, the patient does have a history of subarachnoid hemorrhage, this appeared to be very minute, I would recommend supportive therapy.  5.	The patient appeared to be on Keppra, unclear if she has NO history of underlying seizures this was prophylactically started for the history of subarachnoid hemorrhage will plan wean off  6.	I would recommend Physical Therapy evaluation if possible.  7.	Fall precautions.  8.	no new events   9.	Urology follow up as needed   10.	I attempted to contact the daughter, Freedom Gamez, at [phone].  I also attempted to contact the son, Giovanni, at [phone] and another daughter, Ayesha, at [phone],  8/16/19 up dated her    Thank you for the courtesy of this consultation.    Physical therapy evaluation as tolerated  OOB to chair/ambulation with assistance only if possible.    Greater than 40 minutes spent in direct patient care reviewing  the notes, lab data/ imaging , discussion with multidisciplinary team.
PCP  Subjective:   in bed , awake , alert, bladder scan noted , and foly placed     Objective:   Vital Signs Last 24 Hrs  T(C): 36.9 (19 @ 08:00), Max: 37.1 (19 @ 16:01)  T(F): 98.4 (19 @ 08:00), Max: 98.7 (19 @ 16:01)  HR: 79 (19 @ 08:00) (72 - 88)  BP: 155/88 (19 @ 08:00) (145/83 - 155/88)  BP(mean): --  RR: 19 (19 @ 08:00) (18 - 20)  SpO2: 93% (19 @ 08:00) (93% - 96%)  Daily     Daily   GENERAL:  wdwn female , in bed , calm , ate BF , denies cp ,   EYES: eomi nikunj  NECK: supple ,   CHEST/LUNG: clear upper , rales bases   HEART: s1 s2 regular   ABDOMEN:  soft non tender +  bs  EXTREMITIES:  left hand wrist in brace soft cast   SKIN:  warm ,   CNS:  awake , alert follows simple commands   foly placed to bed side       Allergies: Allergies    No Known Allergies    Intolerances        Home Medications:  cyanocobalamin 1000 mcg oral tablet: 1 tab(s) orally once a day (10 Aug 2019 10:53)  letrozole 2.5 mg oral tablet: 1 tab(s) orally once a day (10 Aug 2019 10:53)  levETIRAcetam 500 mg oral tablet: 1 tab(s) orally 2 times a day (10 Aug 2019 10:53)  pyridoxine 50 mg oral tablet: 1 tab(s) orally once a day (10 Aug 2019 10:53)    Medications:   acetaminophen   Tablet .. 650 milliGRAM(s) Oral every 4 hours PRN  acetaminophen   Tablet .. 650 milliGRAM(s) Oral every 6 hours PRN  cyanocobalamin 1000 MICROGram(s) Oral daily  dextrose 40% Gel 15 Gram(s) Oral once PRN  dextrose 5% + sodium chloride 0.9%. 1000 milliLiter(s) IV Continuous <Continuous>  dextrose 5%. 1000 milliLiter(s) IV Continuous <Continuous>  dextrose 50% Injectable 12.5 Gram(s) IV Push once  dextrose 50% Injectable 25 Gram(s) IV Push once  dextrose 50% Injectable 25 Gram(s) IV Push once  glucagon  Injectable 1 milliGRAM(s) IntraMuscular once PRN  insulin lispro (HumaLOG) corrective regimen sliding scale   SubCutaneous three times a day before meals  letrozole 2.5 milliGRAM(s) Oral daily  levETIRAcetam 500 milliGRAM(s) Oral two times a day  piperacillin/tazobactam IVPB.. 3.375 Gram(s) IV Intermittent every 12 hours  pyridoxine 50 milliGRAM(s) Oral daily      LABS:                        11.4   8.64  )-----------( 103      ( 13 Aug 2019 05:08 )             36.7     08-    143  |  109<H>  |  13  ----------------------------<  139<H>  3.6   |  28  |  0.45<L>    Ca    8.6      13 Aug 2019 05:08        08-10 @ 08:24  INR 1.21    Urinalysis Basic - ( 12 Aug 2019 05:41 )    Color: Yellow / Appearance: Slightly Turbid / S.015 / pH: x  Gluc: x / Ketone: Negative  / Bili: Negative / Urobili: Negative   Blood: x / Protein: Negative / Nitrite: Negative   Leuk Esterase: Trace / RBC: 11-25 /HPF / WBC 0-2   Sq Epi: x / Non Sq Epi: Occasional / Bacteria: Few        CAPILLARY BLOOD GLUCOSE      POCT Blood Glucose.: 121 mg/dL (13 Aug 2019 07:43)  POCT Blood Glucose.: 135 mg/dL (12 Aug 2019 21:19)  POCT Blood Glucose.: 118 mg/dL (12 Aug 2019 16:50)  POCT Blood Glucose.: 201 mg/dL (12 Aug 2019 11:43)          RECENT CULTURES:  Culture Results:   <10,000 CFU/mL Normal Urogenital Shanae ( @ 08:54)  Culture Results:   Growth in aerobic bottle: Multiple Morphological Strains Coag Negative  Staphylococcus  Single set isolate, possible contaminant. Contact  Microbiology if susceptibility testing clinically  indicated.  "Due to technical problems, Proteus sp. will Not be reported as part of  the BCID panel until further notice"  ***Blood Panel PCR results on this specimen are available  approximately 3 hours after the Gram stain result.***  Gram stain, PCR, and/or culture results may not always  correspond due to difference in methodologies.  ************************************************************  This PCR assay was performed using [employer].  The following targets are tested for: Enterococcus,  vancomycin resistant enterococci, Listeria monocytogenes,  coagulase negative staphylococci, S. aureus,  methicillin resistant S. aureus, Streptococcus agalactiae  (Group B), S. pneumoniae, S. pyogenes (Group A),  Acinetobacter baumannii, Enterobacter cloacae, E. coli,  Klebsiella oxytoca, K. pneumoniae, Proteus sp.,  Serratia marcescens, Haemophilus influenzae,  Neisseria meningitidis, Pseudomonas aeruginosa, Candida  albicans, C. glabrata, C krusei, C parapsilosis,  C. tropicalis and the KPC resistance gene. (08-10 @ 10:49)  Culture Results:   No growth to date. (08-10 @ 10:49)        Culture - Urine (collected 19 @ 08:54)  Source: .Urine Clean Catch (Midstream)  Final Report (19 @ 06:52):    <10,000 CFU/mL Normal Urogenital Shanae    Culture - Blood (collected 08-10-19 @ 10:49)  Source: .Blood Blood-Peripheral  Preliminary Report (19 @ 11:01):    No growth to date.    Culture - Blood (collected 08-10-19 @ 10:49)  Source: .Blood Blood-Peripheral  Gram Stain (19 @ 08:06):    Growth in aerobic bottle: Gram Positive Cocci in Clusters  Final Report (19 @ 11:23):    Growth in aerobic bottle: Multiple Morphological Strains Coag Negative    Staphylococcus    Single set isolate, possible contaminant. Contact    Microbiology if susceptibility testing clinically    indicated.    "Due to technical problems, Proteus sp. will Not be reported as part of    the BCID panel until further notice"    ***Blood Panel PCR results on this specimen are available    approximately 3 hours after the Gram stain result.***    Gram stain, PCR, and/or culture results may not always    correspond due to difference in methodologies.    ************************************************************    This PCR assay was performed using [employer].    The following targets are tested for: Enterococcus,    vancomycin resistant enterococci, Listeria monocytogenes,    coagulase negative staphylococci, S. aureus,    methicillin resistant S. aureus, Streptococcus agalactiae    (Group B), S. pneumoniae, S. pyogenes (Group A),    Acinetobacter baumannii, Enterobacter cloacae, E. coli,    Klebsiella oxytoca, K. pneumoniae, Proteus sp.,    Serratia marcescens, Haemophilus influenzae,    Neisseria meningitidis, Pseudomonas aeruginosa, Candida    albicans, C. glabrata, C krusei, C parapsilosis,    C. tropicalis and the KPC resistance gene.  Organism: Blood Culture PCR (19 @ 11:23)  Organism: Blood Culture PCR (19 @ 11:23)      -  Coagulase negative Staphylococcus: Detec      Method Type: PCR
[facility], Division of Infectious Diseases  [facility]  [phone]    Name: FREEDOM MARTINO  Age: 88y  Gender: Female  MRN: [id number]    Interval History--  Notes reviewed  no new complaints sittining in chair    Past Medical History--  Hyperlipidemia  Diabetes mellitus  N      For details regarding the patient's social history, family history, and other miscellaneous elements, please refer the initial infectious diseases consultation and/or the admitting history and physical examination for this admission.    Allergies    No Known Allergies    Intolerances        Medications--  Antibiotics:  piperacillin/tazobactam IVPB.. 3.375 Gram(s) IV Intermittent every 12 hours    Immunologic:    Other:  acetaminophen   Tablet .. PRN  acetaminophen   Tablet .. PRN  cyanocobalamin  dextrose 40% Gel PRN  dextrose 5% + sodium chloride 0.9%.  dextrose 5%.  dextrose 50% Injectable  dextrose 50% Injectable  dextrose 50% Injectable  glucagon  Injectable PRN  insulin lispro (HumaLOG) corrective regimen sliding scale  letrozole  pyridoxine  tamsulosin      Review of Systems--  A 10-point review of systems was obtained.     pt lethargic  no change    Review of systems otherwise negative except as previously noted.    Physical Examination--  Vital Signs: T(F): 97.5 (08-15-19 @ 07:35), Max: 98.6 (08-15-19 @ 04:45)  HR: 67 (08-15-19 @ 11:54)  BP: 131/77 (08-15-19 @ 07:35)  RR: 18 (08-15-19 @ 07:35)  SpO2: 93% (08-15-19 @ 11:54)  Wt(kg): --  General: Nontoxic-appearing Female in no acute distress.  HEENT: AT/NC. FM  Neck: Not rigid. No sense of mass.  Nodes: None palpable.  Lungs: Clear bilaterally without rales, wheezing or rhonchi  Heart: Regular rate and rhythm. No Murmur. No rub. No gallop. No palpable thrill.  Abdomen: Bowel sounds present and normoactive. Soft. Nondistended. Nontender.   Extremities: No cyanosis or clubbing. + edema. + lle with cast wrap  Skin: Warm. Dry. Good turgor. No rash. No vasculitic stigmata.  Psychiatric: Appropriate affect and mood for situation.         Laboratory Studies--  CBC      Chemistries          Culture Data    Culture - Urine (collected 12 Aug 2019 08:54)  Source: .Urine Clean Catch (Midstream)  Final Report (13 Aug 2019 06:52):    <10,000 CFU/mL Normal Urogenital Shanae    Culture - Blood (collected 10 Aug 2019 10:49)  Source: .Blood Blood-Peripheral  Final Report (15 Aug 2019 11:00):    No growth at 5 days.    Culture - Blood (collected 10 Aug 2019 10:49)  Source: .Blood Blood-Peripheral  Gram Stain (11 Aug 2019 08:06):    Growth in aerobic bottle: Gram Positive Cocci in Clusters  Final Report (12 Aug 2019 11:23):    Growth in aerobic bottle: Multiple Morphological Strains Coag Negative    Staphylococcus    Single set isolate, possible contaminant. Contact    Microbiology if susceptibility testing clinically    indicated.    "Due to technical problems, Proteus sp. will Not be reported as part of    the BCID panel until further notice"    ***Blood Panel PCR results on this specimen are available    approximately 3 hours after the Gram stain result.***    Gram stain, PCR, and/or culture results may not always    correspond due to difference in methodologies.    ************************************************************    This PCR assay was performed using [employer].    The following targets are tested for: Enterococcus,    vancomycin resistant enterococci, Listeria monocytogenes,    coagulase negative staphylococci, S. aureus,    methicillin resistant S. aureus, Streptococcus agalactiae    (Group B), S. pneumoniae, S. pyogenes (Group A),    Acinetobacter baumannii, Enterobacter cloacae, E. coli,    Klebsiella oxytoca, K. pneumoniae, Proteus sp.,    Serratia marcescens, Haemophilus influenzae,    Neisseria meningitidis, Pseudomonas aeruginosa, Candida    albicans, C. glabrata, C krusei, C parapsilosis,    C. tropicalis and the KPC resistance gene.  Organism: Blood Culture PCR (12 Aug 2019 11:23)  Organism: Blood Culture PCR (12 Aug 2019 11:23)
pt seen on  rounds feels better wbc improved to  continue iv  fluids and  iv  antibiotcs as  blood  culturs positive  lungs poor  effort  cor  reguloar rate abdomen soft extremities no edema
CHIEF COMPLAINT/ PRESENT FINDINGS:    hx retention        ****************************************************************************************************  PHYSICAL EXAM:    Vital Signs Last 24 Hrs  T(C): 36.4 (15 Aug 2019 07:35), Max: 37 (15 Aug 2019 04:45)  T(F): 97.5 (15 Aug 2019 07:35), Max: 98.6 (15 Aug 2019 04:45)  HR: 72 (15 Aug 2019 07:35) (72 - 103)  BP: 131/77 (15 Aug 2019 07:35) (123/73 - 153/75)  BP(mean): --  RR: 18 (15 Aug 2019 07:35) (16 - 18)  SpO2: 96% (15 Aug 2019 07:35) (94% - 96%)    GENERAL: alert    ABDOMEN: soft   joy draining clear     BACK:    LOWER EXTREMITIES:    NEUROLOGICAL:    **********************************************************************************************************  LABS:              Urine Culture:     RADIOLOGY & ADDITIONAL STUDIES:  EXAM:  US KIDNEY(S)                          EXAM:  US URINARY BLADDER                            PROCEDURE DATE:  08/14/2019          INTERPRETATION:  .    CLINICAL INFORMATION: Hypertension. History of unstable bladder.    TECHNIQUE: Realtime ultrasound of the kidneys were performed. Color   images were also obtained.    COMPARISON: None available.      FINDINGS: The right kidney measures 11.5 cm. The left kidney measures   10.1 cm. There is no right-sided hydronephrosis. There is mild left-sided   hydronephrosis.    The patient is status post Joy catheter placement with the Joy   balloon inside a partially decompressed urinary bladder.    IMPRESSION: Mild left-sided hydronephrosis.    Status post Joy catheter placement with the Joy balloon inside a   partially decompressed urinary bladder.                CLAUDINE CELIS M.D., ATTENDING RADIOLOGIST  This document has been electronically signed. Aug 14 2019  1:26PM          IMPRESSION: mild left hydro  retention   microhematuria     PLAN: on flomax   TOV on friday  do CT renal stone hunt
CHIEF COMPLAINT/ PRESENT FINDINGS:  joy out  voiding with incontinence  using external primafit suction device         ****************************************************************************************************  PHYSICAL EXAM:    Vital Signs Last 24 Hrs  T(C): 36.7 (17 Aug 2019 07:42), Max: 37.2 (16 Aug 2019 15:59)  T(F): 98.1 (17 Aug 2019 07:42), Max: 98.9 (16 Aug 2019 15:59)  HR: 77 (17 Aug 2019 07:42) (77 - 88)  BP: 156/79 (17 Aug 2019 07:42) (156/79 - 178/91)  BP(mean): --  RR: 18 (17 Aug 2019 07:42) (18 - 20)  SpO2: 94% (17 Aug 2019 07:42) (92% - 94%)    GENERAL: alert with oxygen mask     ABDOMEN: soft  no bladder distention     BACK: no cva tenderness     LOWER EXTREMITIES:    NEUROLOGICAL:    **********************************************************************************************************  LABS:              Urine Culture:     RADIOLOGY & ADDITIONAL STUDIES:    EXAM:  CT RENAL STONE HUNT                            PROCEDURE DATE:  08/16/2019          INTERPRETATION:  CLINICAL INFORMATION: Microhematuria. Mild left   hydronephrosis seen on ultrasound.    COMPARISON: Renal ultrasound 8/14/2019.    PROCEDURE:   CT of the Abdomen and Pelvis was performed without intravenous contrast.   Intravenous contrast: None.  Oral contrast: None.  Sagittal and coronal reformats were performed.    FINDINGS:    LOWER CHEST: Small bilateral pleural effusions and underlying compressive   atelectasis.    LIVER: Within normal limits. Coarse calcification along the peritoneal   surface of the right lobe of the liver.  BILE DUCTS: Normal caliber.  GALLBLADDER: Mild gallbladder wall thickening.  SPLEEN: Within normal limits.  PANCREAS: Within normal limits.  ADRENALS: 14 mm indeterminate nodule left adrenal gland.  KIDNEYS/URETERS:     Mild left-sided hydronephrosis.  Punctate less than 2 mm nonobstructing intrarenal calcification left   upper pole.  Possible faint, punctate calcification mid to distal left ureter which is   nonobstructing.  Faint, nonobstructing calcifications right upper pole calyces.    Mild bilateral perinephric stranding.    BLADDER: Punctate air within the wall the urinary bladder, patient status   post recent Joy catheter.  REPRODUCTIVE ORGANS: Post hysterectomy.    BOWEL: Moderate fecal retention with dense stool rectosigmoid colon which   is moderately distended. There is perirectal wall thickening as well as   stranding within the presacral space; findings which may be associated   with a stercoral colitis.  No bowel obstruction noted.   Appendix no CT evidence for acute appendicitis.  PERITONEUM: No ascites.  VESSELS: Atherosclerotic calcification of the abdominal aorta with   infrarenal ectasia measuring 2.4 cm.  RETROPERITONEUM/LYMPH NODES: No lymphadenopathy.    ABDOMINAL WALL: Fat-containing right inguinal hernia.  BONES: Vertebral plasty with compression deformity T12.  Multilevel degenerative changes of the lumbar spine.  Bilateral hip osteoarthritis.    IMPRESSION:     Bilateral nonobstructing intrarenal calcifications.  Mild left-sided hydronephrosis.  Possible punctate, nonobstructing calcifications mid to distal left   ureter.    CT findings as discussed which may reflect stercoral colitis; correlate   clinically.                ANAY ROWAN M.D., ATTENDING RADIOLOGIST  This document has been electronically signed. Aug 16 2019  9:02AM                  IMPRESSION: small non obstructing stones   voiding with incontinence     PLAN: maintain flomax until leaves hospital for rehab in view of helping to pass gravel
CHIEF COMPLAINT/ PRESENT FINDINGS:  joy removed and sent for CT stone hunt   for microhematuria and mild lt hydro on sonogram       ****************************************************************************************************  PHYSICAL EXAM:    Vital Signs Last 24 Hrs  T(C): 36.8 (16 Aug 2019 08:04), Max: 37 (15 Aug 2019 15:39)  T(F): 98.2 (16 Aug 2019 08:04), Max: 98.6 (15 Aug 2019 15:39)  HR: 74 (16 Aug 2019 08:04) (67 - 84)  BP: 169/87 (16 Aug 2019 08:04) (155/72 - 169/87)  BP(mean): --  RR: 18 (16 Aug 2019 08:04) (18 - 18)  SpO2: 95% (16 Aug 2019 08:04) (93% - 96%)    GENERAL  alert     ABDOMEN: soft     BACK: no cva tenderness    LOWER EXTREMITIES:    NEUROLOGICAL:    **********************************************************************************************************  LABS:              Urine Culture:     RADIOLOGY & ADDITIONAL STUDIES:  under review   I do see mile left hydro and maybe some gravel in rt kidney and left ureter but non obstructing     IMPRESSION: hx retention  mild left hyro and gravel type stones    PLAN:hydration  TOV under way now on flomax
Neurology Follow up note    FREEDOM [id number]    HPI:  pt  with increased  temp and  sob  transferred from nh to er  cxr  shows rll infiltrate and wbc  elevated (10 Aug 2019 11:04)      Interval History - doing better    Patient is seen, chart was reviewed and case was discussed with the treatment team.  Pt is not in any distress.   Lying on bed comfortably.   No events reported overnight.   Sitting on chair bed comfortably.    is at bedside.    Vital Signs Last 24 Hrs  T(C): 36.4 (18 Aug 2019 08:32), Max: 36.7 (17 Aug 2019 15:51)  T(F): 97.6 (18 Aug 2019 08:32), Max: 98.1 (17 Aug 2019 15:51)  HR: 83 (18 Aug 2019 08:32) (68 - 84)  BP: 145/65 (18 Aug 2019 08:32) (145/65 - 164/82)  BP(mean): --  RR: 18 (18 Aug 2019 08:32) (18 - 18)  SpO2: 92% (18 Aug 2019 08:32) (91% - 96%)        REVIEW OF SYSTEMS:  limited not in any distress.    On Neurological Examination:    Mental Status - Pt is alert, awake, Follows commands     Speech -  dysarthria.    Cranial Nerves - Pupils 3 mm equal and reactive to light, extraocular eye movements intact. Pt has no visual field deficit.  Pt has no facial asymmetry. Facial sensation is intact.Tongue - is in midline.    Muscle tone - is normal    Motor Exam - 4/5 of ue.  le 3/5    Sensory Exam -. Pt withdraws all extremities equally on stimulation. No asymmetry seen. No complaints of tingling, numbness.      coordination:    Finger to nose: normal      Deep tendon Reflexes - 2 plus all over.     .    Neck Supple -  Yes.     MEDICATIONS    acetaminophen   Tablet .. 650 milliGRAM(s) Oral every 4 hours PRN  acetaminophen   Tablet .. 650 milliGRAM(s) Oral every 6 hours PRN  cyanocobalamin 1000 MICROGram(s) Oral daily  dextrose 40% Gel 15 Gram(s) Oral once PRN  dextrose 5%. 1000 milliLiter(s) IV Continuous <Continuous>  dextrose 50% Injectable 12.5 Gram(s) IV Push once  dextrose 50% Injectable 25 Gram(s) IV Push once  dextrose 50% Injectable 25 Gram(s) IV Push once  glucagon  Injectable 1 milliGRAM(s) IntraMuscular once PRN  insulin lispro (HumaLOG) corrective regimen sliding scale   SubCutaneous three times a day before meals  letrozole 2.5 milliGRAM(s) Oral daily  piperacillin/tazobactam IVPB.. 3.375 Gram(s) IV Intermittent every 12 hours  pyridoxine 50 milliGRAM(s) Oral daily  tamsulosin 0.4 milliGRAM(s) Oral at bedtime      Allergies    No Known Allergies    Intolerances        LABS:            Hemoglobin A1C:     Vitamin B12     RADIOLOGY    ASSESSMENT AND PLAN:      AMS IMPROVING.  HX OF SAH ; WAS ON KEEPRA.  NO KNOWN HX OF SEIZURE  respiratory insufficiency    nebulizer/ supplemental oxygen.  AGREE WITH PLAN OF DC KEPPRA.  Physical therapy evaluation.  OOB to chair/ambulation with assistance only.  Pain is accessed and addressed.  Would continue to follow.
Neurology follow up note    FREEDOM [id number]      Interval History:    Patient feels ok no new complaints.    MEDICATIONS    acetaminophen   Tablet .. 650 milliGRAM(s) Oral every 4 hours PRN  acetaminophen   Tablet .. 650 milliGRAM(s) Oral every 6 hours PRN  cyanocobalamin 1000 MICROGram(s) Oral daily  dextrose 40% Gel 15 Gram(s) Oral once PRN  dextrose 5% + sodium chloride 0.9%. 1000 milliLiter(s) IV Continuous <Continuous>  dextrose 5%. 1000 milliLiter(s) IV Continuous <Continuous>  dextrose 50% Injectable 12.5 Gram(s) IV Push once  dextrose 50% Injectable 25 Gram(s) IV Push once  dextrose 50% Injectable 25 Gram(s) IV Push once  glucagon  Injectable 1 milliGRAM(s) IntraMuscular once PRN  insulin lispro (HumaLOG) corrective regimen sliding scale   SubCutaneous three times a day before meals  letrozole 2.5 milliGRAM(s) Oral daily  levETIRAcetam 250 milliGRAM(s) Oral two times a day  piperacillin/tazobactam IVPB.. 3.375 Gram(s) IV Intermittent every 12 hours  pyridoxine 50 milliGRAM(s) Oral daily  tamsulosin 0.4 milliGRAM(s) Oral at bedtime      Allergies    No Known Allergies    Intolerances            Vital Signs Last 24 Hrs  T(C): 36.4 (15 Aug 2019 07:35), Max: 37 (15 Aug 2019 04:45)  T(F): 97.5 (15 Aug 2019 07:35), Max: 98.6 (15 Aug 2019 04:45)  HR: 72 (15 Aug 2019 07:35) (72 - 103)  BP: 131/77 (15 Aug 2019 07:35) (123/73 - 153/75)  BP(mean): --  RR: 18 (15 Aug 2019 07:35) (16 - 18)  SpO2: 96% (15 Aug 2019 07:35) (94% - 96%)    REVIEW OF SYSTEMS:     Constitutional: No fever, chills, fatigue, weakness  Eyes: no eye pain, visual disturbances, or discharge  ENT:  No difficulty hearing, tinnitus, vertigo; No sinus or throat pain  Neck: No pain or stiffness  Respiratory: No cough, dyspnea, wheezing h/o SOB better overall  Cardiovascular: No chest pain, palpitations,   Gastrointestinal: No abdominal or epigastric pain. No nausea, vomiting  No diarrhea or constipation.   Genitourinary: No dysuria, frequency, hematuria or incontinence  Neurological: No headaches, lightheadedness, vertigo, numbness or tremors  Psychiatric: No depression, anxiety, mood swings or difficulty sleeping  Musculoskeletal: No joint pain or swelling; No muscle, back or extremity pain  Skin: No itching, burning, rashes or lesions   Lymph Nodes: No enlarged glands  Endocrine: No heat or cold intolerance; No hair loss   Allergy and Immunologic: No hives or eczema    On Neurological Examination:    The patient is awake alert.    Extraocular movement appeared to be intact.    Left eye; subtle ptosis was noted, but there was swelling.    Pupils bilaterally were 3 mm, reactive to 2 mm.    Speech was fluent.  Motor:  Right upper had decreased range of motion of the shoulder, was able to elevate roughly 50 degrees and left 60 degrees, would say overall strength bilateral upper was 4/5.    The patient does have a cast on her left wrist and does have a thumb splint on her right hand.    Bilateral lower extremities were 3-/5. Surgical intervention, scar was noted on the right knee.      Follow simple commands    GENERAL Exam: Nontoxic , No Acute Distress   	  HEENT:  normocephalic, atraumatic  		  LUNGS:  Decreased bilaterally  	  HEART: Normal S1S2   No murmur RRR        	  GI/ ABDOMEN:  Soft  Non tender    EXTREMITIES:   No Edema  No Clubbing  No Cyanosis     MUSCULOSKELETAL: decreased Range of Motion all 4 extremities   	   SKIN: Normal  No Ecchymosis               LABS:            Hemoglobin A1C:       Vitamin B12         RADIOLOGY    ANALYSIS AND PLAN:  This is an 88-year-old with an episode of change in mental status and history of subarachnoid hemorrhage.  1.	For change in mental status and lethargy, most likely secondary to metabolic encephalopathy and possibly underlying respiratory issues resolving   2.	Monitor respiratory status.  3.	Antibiotics as needed.  4.	For abnormal head CT, the patient does have a history of subarachnoid hemorrhage, this appeared to be very minute, I would recommend supportive therapy.  5.	The patient appeared to be on Keppra, unclear if she has NO history of underlying seizures this was prophylactically started for the history of subarachnoid hemorrhage will plan wean off  6.	I would recommend Physical Therapy evaluation if possible.  7.	Fall precautions.  8.	no new events   9.	I attempted to contact the daughter, Freedom Gamez, at [phone].  I also attempted to contact the son, Giovanni, at [phone] and another daughter, Ayesha, at [phone], left message today 8/14/19 spoke to son yesterday at bedside 8/13/19    Thank you for the courtesy of this consultation.    Physical therapy evaluation as tolerated  OOB to chair/ambulation with assistance only if possible.    Greater than 45 minutes spent in direct patient care reviewing  the notes, lab data/ imaging , discussion with multidisciplinary team.
Neurology follow up note    FREEDOM [id number]      Interval History:    Patient feels ok no new complaints.    MEDICATIONS    acetaminophen   Tablet .. 650 milliGRAM(s) Oral every 4 hours PRN  acetaminophen   Tablet .. 650 milliGRAM(s) Oral every 6 hours PRN  cyanocobalamin 1000 MICROGram(s) Oral daily  dextrose 40% Gel 15 Gram(s) Oral once PRN  dextrose 5% + sodium chloride 0.9%. 1000 milliLiter(s) IV Continuous <Continuous>  dextrose 5%. 1000 milliLiter(s) IV Continuous <Continuous>  dextrose 50% Injectable 12.5 Gram(s) IV Push once  dextrose 50% Injectable 25 Gram(s) IV Push once  dextrose 50% Injectable 25 Gram(s) IV Push once  glucagon  Injectable 1 milliGRAM(s) IntraMuscular once PRN  insulin lispro (HumaLOG) corrective regimen sliding scale   SubCutaneous three times a day before meals  letrozole 2.5 milliGRAM(s) Oral daily  levETIRAcetam 250 milliGRAM(s) Oral two times a day  piperacillin/tazobactam IVPB.. 3.375 Gram(s) IV Intermittent every 12 hours  pyridoxine 50 milliGRAM(s) Oral daily  tamsulosin 0.4 milliGRAM(s) Oral at bedtime      Allergies    No Known Allergies    Intolerances          Weight (kg): 85.6 (08-14 @ 04:49)    Vital Signs Last 24 Hrs  T(C): 37.2 (14 Aug 2019 07:32), Max: 37.7 (13 Aug 2019 15:59)  T(F): 98.9 (14 Aug 2019 07:32), Max: 99.8 (13 Aug 2019 15:59)  HR: 81 (14 Aug 2019 07:32) (73 - 88)  BP: 146/80 (14 Aug 2019 07:32) (121/77 - 150/83)  BP(mean): --  RR: 20 (14 Aug 2019 07:32) (18 - 20)  SpO2: 94% (14 Aug 2019 07:32) (93% - 95%)    REVIEW OF SYSTEMS:     Constitutional: No fever, chills, fatigue, weakness  Eyes: no eye pain, visual disturbances, or discharge  ENT:  No difficulty hearing, tinnitus, vertigo; No sinus or throat pain  Neck: No pain or stiffness  Respiratory: No cough, dyspnea, wheezing h/o SOB better overall  Cardiovascular: No chest pain, palpitations,   Gastrointestinal: No abdominal or epigastric pain. No nausea, vomiting  No diarrhea or constipation.   Genitourinary: No dysuria, frequency, hematuria or incontinence  Neurological: No headaches, lightheadedness, vertigo, numbness or tremors  Psychiatric: No depression, anxiety, mood swings or difficulty sleeping  Musculoskeletal: No joint pain or swelling; No muscle, back or extremity pain  Skin: No itching, burning, rashes or lesions   Lymph Nodes: No enlarged glands  Endocrine: No heat or cold intolerance; No hair loss   Allergy and Immunologic: No hives or eczema    On Neurological Examination:    The patient is awake alert.    Extraocular movement appeared to be intact.    Left eye; subtle ptosis was noted, but there was swelling.    Pupils bilaterally were 3 mm, reactive to 2 mm.    Speech was fluent.  Motor:  Right upper had decreased range of motion of the shoulder, was able to elevate roughly 50 degrees and left 60 degrees, would say overall strength bilateral upper was 4/5.    The patient does have a cast on her left wrist and does have a thumb splint on her right hand.    Bilateral lower extremities were 3-/5. Surgical intervention, scar was noted on the right knee.      Follow simple commands    GENERAL Exam: Nontoxic , No Acute Distress   	  HEENT:  normocephalic, atraumatic  		  LUNGS:  Decreased bilaterally  	  HEART: Normal S1S2   No murmur RRR        	  GI/ ABDOMEN:  Soft  Non tender    EXTREMITIES:   No Edema  No Clubbing  No Cyanosis     MUSCULOSKELETAL: decreased Range of Motion all 4 extremities   	   SKIN: Normal  No Ecchymosis               LABS:  CBC Full  -  ( 13 Aug 2019 05:08 )  WBC Count : 8.64 K/uL  RBC Count : 4.32 M/uL  Hemoglobin : 11.4 g/dL  Hematocrit : 36.7 %  Platelet Count - Automated : 103 K/uL  Mean Cell Volume : 85.0 fl  Mean Cell Hemoglobin : 26.4 pg  Mean Cell Hemoglobin Concentration : 31.1 gm/dL  Auto Neutrophil # : 6.57 K/uL  Auto Lymphocyte # : 0.78 K/uL  Auto Monocyte # : 1.12 K/uL  Auto Eosinophil # : 0.17 K/uL  Auto Basophil # : 0.00 K/uL  Auto Neutrophil % : 72.0 %  Auto Lymphocyte % : 9.0 %  Auto Monocyte % : 13.0 %  Auto Eosinophil % : 2.0 %  Auto Basophil % : 0.0 %      08-13    143  |  109<H>  |  13  ----------------------------<  139<H>  3.6   |  28  |  0.45<L>    Ca    8.6      13 Aug 2019 05:08      Hemoglobin A1C:       Vitamin B12         RADIOLOGY    ANALYSIS AND PLAN:  This is an 88-year-old with an episode of change in mental status and history of subarachnoid hemorrhage.  1.	For change in mental status and lethargy, most likely secondary to metabolic encephalopathy and possibly underlying respiratory issues resolving   2.	Monitor respiratory status.  3.	Antibiotics as needed.  4.	For abnormal head CT, the patient does have a history of subarachnoid hemorrhage, this appeared to be very minute, I would recommend supportive therapy.  5.	The patient appeared to be on Keppra, unclear if she has NO history of underlying seizures this was prophylactically started for the history of subarachnoid hemorrhage will plan wean off  6.	I would recommend Physical Therapy evaluation if possible.  7.	Fall precautions.  8.	I attempted to contact the daughter, Freedom Gamez, at [phone].  I also attempted to contact the son, Giovanni, at [phone] and another daughter, Ayesha, at [phone], left message today 8/14/19 spoke to son yesterday at bedside 8/13/19    Thank you for the courtesy of this consultation.    Physical therapy evaluation as tolerated  OOB to chair/ambulation with assistance only if possible.    Greater than 45 minutes spent in direct patient care reviewing  the notes, lab data/ imaging , discussion with multidisciplinary team.
Neurology follow up note    FREEDOM [id number]      Interval History:    Patient feels ok no new complaints.    MEDICATIONS    acetaminophen   Tablet .. 650 milliGRAM(s) Oral every 4 hours PRN  acetaminophen   Tablet .. 650 milliGRAM(s) Oral every 6 hours PRN  cyanocobalamin 1000 MICROGram(s) Oral daily  dextrose 40% Gel 15 Gram(s) Oral once PRN  dextrose 5%. 1000 milliLiter(s) IV Continuous <Continuous>  dextrose 50% Injectable 12.5 Gram(s) IV Push once  dextrose 50% Injectable 25 Gram(s) IV Push once  dextrose 50% Injectable 25 Gram(s) IV Push once  glucagon  Injectable 1 milliGRAM(s) IntraMuscular once PRN  insulin lispro (HumaLOG) corrective regimen sliding scale   SubCutaneous three times a day before meals  letrozole 2.5 milliGRAM(s) Oral daily  piperacillin/tazobactam IVPB.. 3.375 Gram(s) IV Intermittent every 12 hours  pyridoxine 50 milliGRAM(s) Oral daily  tamsulosin 0.4 milliGRAM(s) Oral at bedtime      Allergies    No Known Allergies    Intolerances            Vital Signs Last 24 Hrs  T(C): 36.8 (16 Aug 2019 08:04), Max: 37 (15 Aug 2019 15:39)  T(F): 98.2 (16 Aug 2019 08:04), Max: 98.6 (15 Aug 2019 15:39)  HR: 74 (16 Aug 2019 08:04) (67 - 84)  BP: 169/87 (16 Aug 2019 08:04) (155/72 - 169/87)  BP(mean): --  RR: 18 (16 Aug 2019 08:04) (18 - 18)  SpO2: 95% (16 Aug 2019 08:04) (93% - 96%)      REVIEW OF SYSTEMS:     Constitutional: No fever, chills, fatigue, generalized weakness  Eyes: no eye pain, visual disturbances, or discharge  ENT:  No difficulty hearing, tinnitus, vertigo; No sinus or throat pain  Neck: No pain or stiffness  Respiratory: No cough, dyspnea, wheezing h/o SOB better overall  Cardiovascular: No chest pain, palpitations,   Gastrointestinal: No abdominal or epigastric pain. No nausea, vomiting  No diarrhea or constipation.   Genitourinary: No dysuria, frequency, hematuria or incontinence  Neurological: No headaches, lightheadedness, vertigo, numbness or tremors  Psychiatric: No depression, anxiety, mood swings or difficulty sleeping  Musculoskeletal: No joint pain or swelling; No muscle, back or extremity pain  Skin: No itching, burning, rashes or lesions   Lymph Nodes: No enlarged glands  Endocrine: No heat or cold intolerance; No hair loss   Allergy and Immunologic: No hives or eczema    On Neurological Examination:    The patient is awake alert.    Extraocular movement appeared to be intact.    Left eye; subtle ptosis was noted, but there was swelling.    Pupils bilaterally were 3 mm, reactive to 2 mm.    Speech was fluent.  Motor:  Right upper had decreased range of motion of the shoulder, was able to elevate roughly 50 degrees and left 60 degrees, would say overall strength bilateral upper was 4/5.    The patient does have a cast on her left wrist and does have a thumb splint on her right hand.    Bilateral lower extremities were 3-/5. Surgical intervention, scar was noted on the right knee.      Follow simple commands    GENERAL Exam: Nontoxic , No Acute Distress   	  HEENT:  normocephalic, atraumatic  		  LUNGS:  Decreased bilaterally  	  HEART: Normal S1S2   No murmur RRR        	  GI/ ABDOMEN:  Soft  Non tender    EXTREMITIES:   No Edema  No Clubbing  No Cyanosis     MUSCULOSKELETAL: decreased Range of Motion all 4 extremities   	   SKIN: Normal  No Ecchymosis               LABS:            Hemoglobin A1C:       Vitamin B12         RADIOLOGY    ANALYSIS AND PLAN:  This is an 88-year-old with an episode of change in mental status and history of subarachnoid hemorrhage.  1.	For change in mental status and lethargy, most likely secondary to metabolic encephalopathy and possibly underlying respiratory issues resolving   2.	Monitor respiratory status.  3.	Antibiotics as needed.  4.	For abnormal head CT, the patient does have a history of subarachnoid hemorrhage, this appeared to be very minute, I would recommend supportive therapy.  5.	The patient appeared to be on Keppra, unclear if she has NO history of underlying seizures this was prophylactically started for the history of subarachnoid hemorrhage will plan wean off  6.	I would recommend Physical Therapy evaluation if possible.  7.	Fall precautions.  8.	no new events   9.	Urology follow up as needed   10.	I attempted to contact the daughter, Freedom Gamez, at [phone].  I also attempted to contact the son, Giovanni, at [phone] and another daughter, Ayesha, at [phone],  8/16/19 up dated her    Thank you for the courtesy of this consultation.    Physical therapy evaluation as tolerated  OOB to chair/ambulation with assistance only if possible.    Greater than 40 minutes spent in direct patient care reviewing  the notes, lab data/ imaging , discussion with multidisciplinary team.
Neurology follow up note    FREEDOM [id number]      Interval History:    Patient feels ok  seen with [facility]     MEDICATIONS    acetaminophen   Tablet .. 650 milliGRAM(s) Oral every 4 hours PRN  acetaminophen   Tablet .. 650 milliGRAM(s) Oral every 6 hours PRN  cyanocobalamin 1000 MICROGram(s) Oral daily  dextrose 40% Gel 15 Gram(s) Oral once PRN  dextrose 5% + sodium chloride 0.9%. 1000 milliLiter(s) IV Continuous <Continuous>  dextrose 5%. 1000 milliLiter(s) IV Continuous <Continuous>  dextrose 50% Injectable 12.5 Gram(s) IV Push once  dextrose 50% Injectable 25 Gram(s) IV Push once  dextrose 50% Injectable 25 Gram(s) IV Push once  glucagon  Injectable 1 milliGRAM(s) IntraMuscular once PRN  insulin lispro (HumaLOG) corrective regimen sliding scale   SubCutaneous three times a day before meals  letrozole 2.5 milliGRAM(s) Oral daily  levETIRAcetam 500 milliGRAM(s) Oral two times a day  piperacillin/tazobactam IVPB.. 3.375 Gram(s) IV Intermittent every 12 hours  pyridoxine 50 milliGRAM(s) Oral daily      Allergies    No Known Allergies    Intolerances            Vital Signs Last 24 Hrs  T(C): 36.9 (13 Aug 2019 08:00), Max: 37.1 (12 Aug 2019 16:01)  T(F): 98.4 (13 Aug 2019 08:00), Max: 98.7 (12 Aug 2019 16:01)  HR: 79 (13 Aug 2019 08:00) (72 - 88)  BP: 155/88 (13 Aug 2019 08:00) (145/83 - 155/88)  BP(mean): --  RR: 19 (13 Aug 2019 08:00) (18 - 20)  SpO2: 93% (13 Aug 2019 08:00) (93% - 96%)      REVIEW OF SYSTEMS:     Constitutional: No fever, chills, fatigue, weakness  Eyes: no eye pain, visual disturbances, or discharge  ENT:  No difficulty hearing, tinnitus, vertigo; No sinus or throat pain  Neck: No pain or stiffness  Respiratory: No cough, dyspnea, wheezing   Cardiovascular: No chest pain, palpitations,   Gastrointestinal: No abdominal or epigastric pain. No nausea, vomiting  No diarrhea or constipation.   Genitourinary: No dysuria, frequency, hematuria or incontinence  Neurological: No headaches, lightheadedness, vertigo, numbness or tremors  Psychiatric: No depression, anxiety, mood swings or difficulty sleeping  Musculoskeletal: No joint pain or swelling; No muscle, back or extremity pain  Skin: No itching, burning, rashes or lesions   Lymph Nodes: No enlarged glands  Endocrine: No heat or cold intolerance; No hair loss   Allergy and Immunologic: No hives or eczema    On Neurological Examination:    The patient is awake alert.    Extraocular movement appeared to be intact.    Left eye; subtle ptosis was noted, but there was swelling.    Pupils bilaterally were 3 mm, reactive to 2 mm.    Speech was fluent.  Motor:  Right upper had decreased range of motion of the shoulder, was able to elevate roughly 50 degrees and left 60 degrees, would say overall strength bilateral upper was 4/5.    The patient does have a cast on her left wrist and does have a thumb splint on her right hand.    Bilateral lower extremities were 3-/5. Surgical intervention, scar was noted on the right knee.      Follow simple commands    GENERAL Exam: Nontoxic , No Acute Distress   	  HEENT:  normocephalic, atraumatic  		  LUNGS:  Decreased bilaterally  	  HEART: Normal S1S2   No murmur RRR        	  GI/ ABDOMEN:  Soft  Non tender    EXTREMITIES:   No Edema  No Clubbing  No Cyanosis     MUSCULOSKELETAL: decreased Range of Motion all 4 extremities   	   SKIN: Normal  No Ecchymosis               LABS:  CBC Full  -  ( 13 Aug 2019 05:08 )  WBC Count : 8.64 K/uL  RBC Count : 4.32 M/uL  Hemoglobin : 11.4 g/dL  Hematocrit : 36.7 %  Platelet Count - Automated : 103 K/uL  Mean Cell Volume : 85.0 fl  Mean Cell Hemoglobin : 26.4 pg  Mean Cell Hemoglobin Concentration : 31.1 gm/dL  Auto Neutrophil # : 6.57 K/uL  Auto Lymphocyte # : 0.78 K/uL  Auto Monocyte # : 1.12 K/uL  Auto Eosinophil # : 0.17 K/uL  Auto Basophil # : 0.00 K/uL  Auto Neutrophil % : 72.0 %  Auto Lymphocyte % : 9.0 %  Auto Monocyte % : 13.0 %  Auto Eosinophil % : 2.0 %  Auto Basophil % : 0.0 %    Urinalysis Basic - ( 12 Aug 2019 05:41 )    Color: Yellow / Appearance: Slightly Turbid / S.015 / pH: x  Gluc: x / Ketone: Negative  / Bili: Negative / Urobili: Negative   Blood: x / Protein: Negative / Nitrite: Negative   Leuk Esterase: Trace / RBC: 11-25 /HPF / WBC 0-2   Sq Epi: x / Non Sq Epi: Occasional / Bacteria: Few          143  |  109<H>  |  13  ----------------------------<  139<H>  3.6   |  28  |  0.45<L>    Ca    8.6      13 Aug 2019 05:08      Hemoglobin A1C:       Vitamin B12         RADIOLOGY    ANALYSIS AND PLAN:  This is an 88-year-old with an episode of change in mental status and history of subarachnoid hemorrhage.  1.	For change in mental status and lethargy, most likely secondary to metabolic encephalopathy and possibly underlying respiratory issues.  2.	Monitor respiratory status.  3.	Antibiotics as needed.  4.	For abnormal head CT, the patient does have a history of subarachnoid hemorrhage, this appeared to be very minute, I would recommend supportive therapy.  5.	The patient appeared to be on Keppra, unclear if she has a history of underlying seizures or this was prophylactically started for the history of subarachnoid hemorrhage.  6.	I would recommend Physical Therapy evaluation if possible.  7.	Fall precautions.  8.	I attempted to contact the daughter, Freedom Gamez, at [phone].  I also attempted to contact the son, Giovanni, at [phone] and another daughter, Ayesha, at [phone], no one responded 19    Thank you for the courtesy of this consultation.    Physical therapy evaluation as tolerated  OOB to chair/ambulation with assistance only if possible.    Greater than 45 minutes spent in direct patient care reviewing  the notes, lab data/ imaging , discussion with multidisciplinary team.
Neurology follow up note    FREEDOM [id number]      Interval History:    Patient feels ok no new complaints.    MEDICATIONS    acetaminophen   Tablet .. 650 milliGRAM(s) Oral every 4 hours PRN  acetaminophen   Tablet .. 650 milliGRAM(s) Oral every 6 hours PRN  amLODIPine   Tablet 2.5 milliGRAM(s) Oral daily  cyanocobalamin 1000 MICROGram(s) Oral daily  dextrose 40% Gel 15 Gram(s) Oral once PRN  dextrose 5%. 1000 milliLiter(s) IV Continuous <Continuous>  dextrose 50% Injectable 12.5 Gram(s) IV Push once  dextrose 50% Injectable 25 Gram(s) IV Push once  dextrose 50% Injectable 25 Gram(s) IV Push once  glucagon  Injectable 1 milliGRAM(s) IntraMuscular once PRN  insulin lispro (HumaLOG) corrective regimen sliding scale   SubCutaneous three times a day before meals  letrozole 2.5 milliGRAM(s) Oral daily  melatonin 5 milliGRAM(s) Oral at bedtime  pyridoxine 50 milliGRAM(s) Oral daily  tamsulosin 0.4 milliGRAM(s) Oral at bedtime      Allergies    No Known Allergies    Intolerances            Vital Signs Last 24 Hrs  T(C): 36.9 (20 Aug 2019 14:56), Max: 37.3 (19 Aug 2019 15:54)  T(F): 98.4 (20 Aug 2019 14:56), Max: 99.2 (19 Aug 2019 15:54)  HR: 84 (20 Aug 2019 14:56) (79 - 95)  BP: 146/78 (20 Aug 2019 14:56) (146/78 - 171/93)  BP(mean): --  RR: 16 (20 Aug 2019 14:56) (16 - 18)  SpO2: 92% (20 Aug 2019 14:56) (92% - 95%)      REVIEW OF SYSTEMS:     Constitutional: No fever, chills, fatigue, generalized weakness  Eyes: no eye pain, visual disturbances, or discharge  ENT:  No difficulty hearing, tinnitus, vertigo; No sinus or throat pain  Neck: No pain or stiffness  Respiratory: No cough, dyspnea, wheezing h/o SOB better overall  Cardiovascular: No chest pain, palpitations,   Gastrointestinal: No abdominal or epigastric pain. No nausea, vomiting  No diarrhea or constipation.   Genitourinary: No dysuria, frequency, hematuria or incontinence  Neurological: No headaches, lightheadedness, vertigo, numbness or tremors  Psychiatric: No depression, anxiety, mood swings or difficulty sleeping  Musculoskeletal: No joint pain or swelling; No muscle, back or extremity pain  Skin: No itching, burning, rashes or lesions   Lymph Nodes: No enlarged glands  Endocrine: No heat or cold intolerance; No hair loss   Allergy and Immunologic: No hives or eczema    On Neurological Examination:    The patient is awake alert.    Extraocular movement appeared to be intact.    Left eye; subtle ptosis was noted, but there was swelling.    Pupils bilaterally were 3 mm, reactive to 2 mm.    Speech was fluent.  Motor:  Right upper had decreased range of motion of the shoulder, was able to elevate roughly 50 degrees and left 60 degrees, would say overall strength bilateral upper was 4/5.    The patient does have a cast on her left wrist and does have a thumb splint on her right hand.    Bilateral lower extremities were 3-/5. Surgical intervention, scar was noted on the right knee.      Follow simple commands    GENERAL Exam: Nontoxic , No Acute Distress   	  HEENT:  normocephalic, atraumatic  		  LUNGS:  Decreased bilaterally  	  HEART: Normal S1S2   No murmur RRR        	  GI/ ABDOMEN:  Soft  Non tender    EXTREMITIES:   No Edema  No Clubbing  No Cyanosis     MUSCULOSKELETAL: decreased Range of Motion all 4 extremities   	   SKIN: Normal  No Ecchymosis             LABS:      08-19    143  |  102  |  10  ----------------------------<  89  3.7   |  34<H>  |  0.37<L>    Ca    8.2<L>      19 Aug 2019 06:24  Mg     1.2     08-19      Hemoglobin A1C:       Vitamin B12         RADIOLOGY      ANALYSIS AND PLAN:  This is an 88-year-old with an episode of change in mental status and history of subarachnoid hemorrhage.  1.	For change in mental status and lethargy, most likely secondary to metabolic encephalopathy and possibly underlying respiratory issues resolving   2.	Monitor respiratory status.  3.	Antibiotics as needed.  4.	For abnormal head CT, the patient does have a history of subarachnoid hemorrhage, this appeared to be very minute, I would recommend supportive therapy.  5.	The patient appeared to be on Keppra, unclear if she has NO history of underlying seizures this was prophylactically started for the history of subarachnoid hemorrhage will plan wean off  6.	I would recommend Physical Therapy evaluation if possible.  7.	Fall precautions.  8.	no new events   9.	Urology follow up as needed   10.	I attempted to contact the daughter, Freedom Gamez, at [phone].  I also attempted to contact the son, Giovanni, at [phone] and another daughter, Ayesha, at [phone],  8/20/19 updated her    Thank you for the courtesy of this consultation.    Physical therapy evaluation as tolerated  OOB to chair/ambulation with assistance only if possible.    Greater than 40 minutes spent in direct patient care reviewing  the notes, lab data/ imaging , discussion with multidisciplinary team.
PCP  Subjective:   in bed , awake , alert , responsive , confused ,     Objective:   Vital Signs Last 24 Hrs  T(C): 36.4 (19 @ 07:57), Max: 37.3 (19 @ 16:56)  T(F): 97.6 (19 @ 07:57), Max: 99.2 (19 @ 20:32)  HR: 73 (19 @ 07:57) (73 - 98)  BP: 148/74 (19 @ 07:57) (127/80 - 168/88)  BP(mean): --  RR: 16 (19 @ 07:57) (16 - 22)  SpO2: 95% (19 @ 07:57) (91% - 97%)  Daily     Daily     GENERAL:  wdwn female , in bed , calm , ate BF , denies cp ,   EYES: eomi nikunj  NECK: supple ,   CHEST/LUNG: clear upper , rales bases   HEART: s1 s2 regular   ABDOMEN:  soft non tender +  bs  EXTREMITIES:  left hand wrist in brace soft cast   SKIN:  warm ,   CNS:  awake , alert follows simple commands     Allergies: Allergies    No Known Allergies    Intolerances        Home Medications:  cyanocobalamin 1000 mcg oral tablet: 1 tab(s) orally once a day (10 Aug 2019 10:53)  letrozole 2.5 mg oral tablet: 1 tab(s) orally once a day (10 Aug 2019 10:53)  levETIRAcetam 500 mg oral tablet: 1 tab(s) orally 2 times a day (10 Aug 2019 10:53)  pyridoxine 50 mg oral tablet: 1 tab(s) orally once a day (10 Aug 2019 10:53)    Medications:   acetaminophen   Tablet .. 650 milliGRAM(s) Oral every 4 hours PRN  cyanocobalamin 1000 MICROGram(s) Oral daily  dextrose 40% Gel 15 Gram(s) Oral once PRN  dextrose 5% + sodium chloride 0.9%. 1000 milliLiter(s) IV Continuous <Continuous>  dextrose 5%. 1000 milliLiter(s) IV Continuous <Continuous>  dextrose 50% Injectable 12.5 Gram(s) IV Push once  dextrose 50% Injectable 25 Gram(s) IV Push once  dextrose 50% Injectable 25 Gram(s) IV Push once  glucagon  Injectable 1 milliGRAM(s) IntraMuscular once PRN  insulin lispro (HumaLOG) corrective regimen sliding scale   SubCutaneous three times a day before meals  letrozole 2.5 milliGRAM(s) Oral daily  levETIRAcetam 500 milliGRAM(s) Oral two times a day  piperacillin/tazobactam IVPB.. 3.375 Gram(s) IV Intermittent every 12 hours  pyridoxine 50 milliGRAM(s) Oral daily      LABS:                        11.1   9.82  )-----------( 112      ( 12 Aug 2019 05:20 )             36.1     08-12    146<H>  |  113<H>  |  21  ----------------------------<  139<H>  3.8   |  27  |  0.53    Ca    8.2<L>      12 Aug 2019 05:20        08-10 @ 08:24  INR 1.21    Urinalysis Basic - ( 12 Aug 2019 05:41 )    Color: Yellow / Appearance: Slightly Turbid / S.015 / pH: x  Gluc: x / Ketone: Negative  / Bili: Negative / Urobili: Negative   Blood: x / Protein: Negative / Nitrite: Negative   Leuk Esterase: Trace / RBC: 11-25 /HPF / WBC 0-2   Sq Epi: x / Non Sq Epi: Occasional / Bacteria: Few        CAPILLARY BLOOD GLUCOSE      POCT Blood Glucose.: 155 mg/dL (12 Aug 2019 07:45)  POCT Blood Glucose.: 142 mg/dL (11 Aug 2019 21:20)  POCT Blood Glucose.: 139 mg/dL (11 Aug 2019 16:55)  POCT Blood Glucose.: 146 mg/dL (11 Aug 2019 11:54)          RECENT CULTURES:  Culture Results:   Growth in aerobic bottle: Gram Positive Cocci in Clusters  "Due to technical problems, Proteus sp. will Not be reported as part of  the BCID panel until further notice"  ***Blood Panel PCR results on this specimen are available  approximately 3 hours after the Gram stain result.***  Gram stain, PCR, and/or culture results may not always  correspond due to difference in methodologies.  ************************************************************  This PCR assay was performed using [employer].  The following targets are tested for: Enterococcus,  vancomycin resistant enterococci, Listeria monocytogenes,  coagulase negative staphylococci, S. aureus,  methicillin resistant S. aureus, Streptococcus agalactiae  (Group B), S. pneumoniae, S.pyogenes (Group A),  Acinetobacter baumannii, Enterobacter cloacae, E. coli,  Klebsiella oxytoca, K. pneumoniae, Proteus sp.,  Serratia marcescens, Haemophilus influenzae,  Neisseria meningitidis, Pseudomonas aeruginosa, Candida  albicans, C. glabrata, C krusei, C parapsilosis,  C. tropicalis and the KPC resistance gene. (08-10 @ 10:49)  Culture Results:   No growth to date. (08-10 @ 10:49)        Culture - Blood (collected 08-10-19 @ 10:49)  Source: .Blood Blood-Peripheral  Preliminary Report (19 @ 11:01):    No growth to date.    Culture - Blood (collected 08-10-19 @ 10:49)  Source: .Blood Blood-Peripheral  Gram Stain (19 @ 08:06):    Growth in aerobic bottle: Gram Positive Cocci in Clusters  Preliminary Report (19 @ 08:06):    Growth in aerobic bottle: Gram Positive Cocci in Clusters    "Due to technical problems, Proteus sp. will Not be reported as part of    the BCID panel until further notice"    ***Blood Panel PCR results on this specimen are available    approximately 3 hours after the Gram stain result.***    Gram stain, PCR, and/or culture results may not always    correspond due to difference in methodologies.    ************************************************************    This PCR assay was performed using [employer].    The following targets are tested for: Enterococcus,    vancomycin resistant enterococci, Listeria monocytogenes,    coagulase negative staphylococci, S. aureus,    methicillin resistant S. aureus, Streptococcus agalactiae    (Group B), S. pneumoniae, S.pyogenes (Group A),    Acinetobacter baumannii, Enterobacter cloacae, E. coli,    Klebsiella oxytoca, K. pneumoniae, Proteus sp.,    Serratia marcescens, Haemophilus influenzae,    Neisseria meningitidis, Pseudomonas aeruginosa, Candida    albicans, C. glabrata, C krusei, C parapsilosis,    C. tropicalis and the KPC resistance gene.  Organism: Blood Culture PCR (19 @ 10:47)  Organism: Blood Culture PCR (19 @ 10:47)      -  Coagulase negative Staphylococcus: Detec      Method Type: PCR
PCP  Subjective:   in bed , awake alert     Objective:   Vital Signs Last 24 Hrs  T(C): 37.2 (19 @ 07:32), Max: 37.7 (19 @ 15:59)  T(F): 98.9 (19 @ 07:32), Max: 99.8 (19 @ 15:59)  HR: 81 (19 @ 07:32) (73 - 88)  BP: 146/80 (19 @ 07:32) (121/77 - 150/83)  BP(mean): --  RR: 20 (19 @ 07:32) (18 - 20)  SpO2: 94% (19 @ 07:32) (93% - 95%)  Daily     Daily Weight in k.6 (14 Aug 2019 08:47)    GENERAL:  wdwn female , in bed , calm , ate BF , denies cp ,   EYES: eomi nikunj  NECK: supple ,   CHEST/LUNG: clear upper , rales bases   HEART: s1 s2 regular   ABDOMEN:  soft non tender +  bs  EXTREMITIES:  left hand wrist in brace soft cast   SKIN:  warm ,   CNS:  awake , alert follows simple commands   foly placed to bed side       Allergies: Allergies    No Known Allergies    Intolerances        Home Medications:  cyanocobalamin 1000 mcg oral tablet: 1 tab(s) orally once a day (10 Aug 2019 10:53)  letrozole 2.5 mg oral tablet: 1 tab(s) orally once a day (10 Aug 2019 10:53)  levETIRAcetam 500 mg oral tablet: 1 tab(s) orally 2 times a day (10 Aug 2019 10:53)  pyridoxine 50 mg oral tablet: 1 tab(s) orally once a day (10 Aug 2019 10:53)    Medications:   acetaminophen   Tablet .. 650 milliGRAM(s) Oral every 4 hours PRN  acetaminophen   Tablet .. 650 milliGRAM(s) Oral every 6 hours PRN  cyanocobalamin 1000 MICROGram(s) Oral daily  dextrose 40% Gel 15 Gram(s) Oral once PRN  dextrose 5% + sodium chloride 0.9%. 1000 milliLiter(s) IV Continuous <Continuous>  dextrose 5%. 1000 milliLiter(s) IV Continuous <Continuous>  dextrose 50% Injectable 12.5 Gram(s) IV Push once  dextrose 50% Injectable 25 Gram(s) IV Push once  dextrose 50% Injectable 25 Gram(s) IV Push once  glucagon  Injectable 1 milliGRAM(s) IntraMuscular once PRN  insulin lispro (HumaLOG) corrective regimen sliding scale   SubCutaneous three times a day before meals  letrozole 2.5 milliGRAM(s) Oral daily  levETIRAcetam 250 milliGRAM(s) Oral two times a day  piperacillin/tazobactam IVPB.. 3.375 Gram(s) IV Intermittent every 12 hours  pyridoxine 50 milliGRAM(s) Oral daily  tamsulosin 0.4 milliGRAM(s) Oral at bedtime      LABS:                        11.4   8.64  )-----------( 103      ( 13 Aug 2019 05:08 )             36.7         143  |  109<H>  |  13  ----------------------------<  139<H>  3.6   |  28  |  0.45<L>    Ca    8.6      13 Aug 2019 05:08        08-10 @ 08:24  INR 1.21        CAPILLARY BLOOD GLUCOSE      POCT Blood Glucose.: 112 mg/dL (14 Aug 2019 11:57)  POCT Blood Glucose.: 133 mg/dL (14 Aug 2019 07:26)  POCT Blood Glucose.: 143 mg/dL (13 Aug 2019 21:17)  POCT Blood Glucose.: 159 mg/dL (13 Aug 2019 16:30)          RECENT CULTURES:  Culture Results:   <10,000 CFU/mL Normal Urogenital Shanae ( @ 08:54)  Culture Results:   Growth in aerobic bottle: Multiple Morphological Strains Coag Negative  Staphylococcus  Single set isolate, possible contaminant. Contact  Microbiology if susceptibility testing clinically  indicated.  "Due to technical problems, Proteus sp. will Not be reported as part of  the BCID panel until further notice"  ***Blood Panel PCR results on this specimen are available  approximately 3 hours after the Gram stain result.***  Gram stain, PCR, and/or culture results may not always  correspond due to difference in methodologies.  ************************************************************  This PCR assay was performed using [employer].  The following targets are tested for: Enterococcus,  vancomycin resistant enterococci, Listeria monocytogenes,  coagulase negative staphylococci, S. aureus,  methicillin resistant S. aureus, Streptococcus agalactiae  (Group B), S. pneumoniae, S. pyogenes (Group A),  Acinetobacter baumannii, Enterobacter cloacae, E. coli,  Klebsiella oxytoca, K. pneumoniae, Proteus sp.,  Serratia marcescens, Haemophilus influenzae,  Neisseria meningitidis, Pseudomonas aeruginosa, Candida  albicans, C. glabrata, C krusei, C parapsilosis,  C. tropicalis and the KPC resistance gene. (08-10 @ 10:49)  Culture Results:   No growth to date. (08-10 @ 10:49)        Culture - Urine (collected 19 @ 08:54)  Source: .Urine Clean Catch (Midstream)  Final Report (19 @ 06:52):    <10,000 CFU/mL Normal Urogenital Shanae    Culture - Blood (collected 08-10-19 @ 10:49)  Source: .Blood Blood-Peripheral  Preliminary Report (19 @ 11:01):    No growth to date.    Culture - Blood (collected 08-10-19 @ 10:49)  Source: .Blood Blood-Peripheral  Gram Stain (19 @ 08:06):    Growth in aerobic bottle: Gram Positive Cocci in Clusters  Final Report (19 @ 11:23):    Growth in aerobic bottle: Multiple Morphological Strains Coag Negative    Staphylococcus    Single set isolate, possible contaminant. Contact    Microbiology if susceptibility testing clinically    indicated.    "Due to technical problems, Proteus sp. will Not be reported as part of    the BCID panel until further notice"    ***Blood Panel PCR results on this specimen are available    approximately 3 hours after the Gram stain result.***    Gram stain, PCR, and/or culture results may not always    correspond due to difference in methodologies.    ************************************************************    This PCR assay was performed using [employer].    The following targets are tested for: Enterococcus,    vancomycin resistant enterococci, Listeria monocytogenes,    coagulase negative staphylococci, S. aureus,    methicillin resistant S. aureus, Streptococcus agalactiae    (Group B), S. pneumoniae, S. pyogenes (Group A),    Acinetobacter baumannii, Enterobacter cloacae, E. coli,    Klebsiella oxytoca, K. pneumoniae, Proteus sp.,    Serratia marcescens, Haemophilus influenzae,    Neisseria meningitidis, Pseudomonas aeruginosa, Candida    albicans, C. glabrata, C krusei, C parapsilosis,    C. tropicalis and the KPC resistance gene.  Organism: Blood Culture PCR (19 @ 11:23)  Organism: Blood Culture PCR (19 @ 11:23)      -  Coagulase negative Staphylococcus: Detec      Method Type: PCR
PCP  Subjective:   in chair awake ,     Objective:   Vital Signs Last 24 Hrs  T(C): 37.3 (08-19-19 @ 15:54), Max: 37.3 (08-19-19 @ 15:54)  T(F): 99.2 (08-19-19 @ 15:54), Max: 99.2 (08-19-19 @ 15:54)  HR: 95 (08-19-19 @ 15:54) (89 - 118)  BP: 162/93 (08-19-19 @ 15:54) (152/84 - 182/82)  BP(mean): --  RR: 18 (08-19-19 @ 15:54) (18 - 18)  SpO2: 95% (08-19-19 @ 15:54) (93% - 95%)  Daily     Daily     GENERAL:  wdwn  EYES:   NECK:   CHEST/LUNG: clear  HEART: s1 s2 regular  ABDOMEN:  soft  EXTREMITIES:  left wrist edema and finger edema , and in soft cast   SKIN:  warm   CNS:  awake , alert   non focal confused   Allergies: Allergies    No Known Allergies    Intolerances        Home Medications:  cyanocobalamin 1000 mcg oral tablet: 1 tab(s) orally once a day (10 Aug 2019 10:53)  letrozole 2.5 mg oral tablet: 1 tab(s) orally once a day (10 Aug 2019 10:53)  levETIRAcetam 500 mg oral tablet: 1 tab(s) orally 2 times a day (10 Aug 2019 10:53)  pyridoxine 50 mg oral tablet: 1 tab(s) orally once a day (10 Aug 2019 10:53)    Medications:   acetaminophen   Tablet .. 650 milliGRAM(s) Oral every 4 hours PRN  acetaminophen   Tablet .. 650 milliGRAM(s) Oral every 6 hours PRN  cyanocobalamin 1000 MICROGram(s) Oral daily  dextrose 40% Gel 15 Gram(s) Oral once PRN  dextrose 5%. 1000 milliLiter(s) IV Continuous <Continuous>  dextrose 50% Injectable 12.5 Gram(s) IV Push once  dextrose 50% Injectable 25 Gram(s) IV Push once  dextrose 50% Injectable 25 Gram(s) IV Push once  glucagon  Injectable 1 milliGRAM(s) IntraMuscular once PRN  insulin lispro (HumaLOG) corrective regimen sliding scale   SubCutaneous three times a day before meals  letrozole 2.5 milliGRAM(s) Oral daily  melatonin 5 milliGRAM(s) Oral at bedtime  pyridoxine 50 milliGRAM(s) Oral daily  tamsulosin 0.4 milliGRAM(s) Oral at bedtime      LABS:    08-19    143  |  102  |  10  ----------------------------<  89  3.7   |  34<H>  |  0.37<L>    Ca    8.2<L>      19 Aug 2019 06:24  Mg     1.2     08-19              CAPILLARY BLOOD GLUCOSE      POCT Blood Glucose.: 104 mg/dL (19 Aug 2019 16:21)  POCT Blood Glucose.: 93 mg/dL (19 Aug 2019 11:43)  POCT Blood Glucose.: 94 mg/dL (19 Aug 2019 07:49)  POCT Blood Glucose.: 91 mg/dL (18 Aug 2019 21:24)          RECENT CULTURES:
pt  seen on rounds more alert vss afebrile lungs poor  effort  cor regular rate abdomen soft  extremities no changes
pt  seen on rounds o2 in place lungs decreased sounds  cor regular rate abdomen soft  extrmities no edema k 2,6 po supplements ordered to follow labs and mag  levels
pt  seen on rounds getting rt vss afebrile  lungs poor effort  rales  bases cor regular rate abdomen soft extremities no edema to get cxr  today and  continue antibiotics
pt seen on rounds cxr shows chf to reduce iv  fluids ct shows small stone lungs poor effort cor regular rate abdomen soft extremities no edema
PCP  Subjective:   in bed , awake , alert , denies pain except in left hand     Objective:   Vital Signs Last 24 Hrs  T(C): 36.8 (08-20-19 @ 07:30), Max: 37.3 (08-19-19 @ 15:54)  T(F): 98.3 (08-20-19 @ 07:30), Max: 99.2 (08-19-19 @ 15:54)  HR: 79 (08-20-19 @ 07:30) (79 - 118)  BP: 171/93 (08-20-19 @ 07:30) (151/83 - 171/93)  BP(mean): --  RR: 17 (08-20-19 @ 07:30) (17 - 18)  SpO2: 93% (08-20-19 @ 07:30) (93% - 95%)  Daily     Daily     GENERAL:  wdwn female , in bed , calm , ate BF , denies cp ,   EYES: eomi nikunj  NECK: supple ,   CHEST/LUNG: clear upper , rales bases   HEART: s1 s2 regular   ABDOMEN:  soft non tender +  bs  EXTREMITIES:  left hand wrist in brace soft cast   SKIN:  warm ,   CNS:  awake , alert follows simple commands   foly placed to bed side       Allergies: Allergies    No Known Allergies    Intolerances        Home Medications:  acetaminophen 325 mg oral tablet: 2 tab(s) orally every 6 hours, As needed, Temp greater or equal to 38C (100.4F), Mild Pain (1 - 3) (19 Aug 2019 17:48)  insulin lispro (concentrated) 200 units/mL subcutaneous solution:  subcutaneous  (19 Aug 2019 17:48)  letrozole 2.5 mg oral tablet: 1 tab(s) orally once a day (10 Aug 2019 10:53)  melatonin 5 mg oral tablet: 1 tab(s) orally once a day (at bedtime) (19 Aug 2019 17:48)  pyridoxine 50 mg oral tablet: 1 tab(s) orally once a day (10 Aug 2019 10:53)  tamsulosin 0.4 mg oral capsule: 1 cap(s) orally once a day (at bedtime) (19 Aug 2019 17:48)    Medications:   acetaminophen   Tablet .. 650 milliGRAM(s) Oral every 4 hours PRN  acetaminophen   Tablet .. 650 milliGRAM(s) Oral every 6 hours PRN  amLODIPine   Tablet 2.5 milliGRAM(s) Oral daily  cyanocobalamin 1000 MICROGram(s) Oral daily  dextrose 40% Gel 15 Gram(s) Oral once PRN  dextrose 5%. 1000 milliLiter(s) IV Continuous <Continuous>  dextrose 50% Injectable 12.5 Gram(s) IV Push once  dextrose 50% Injectable 25 Gram(s) IV Push once  dextrose 50% Injectable 25 Gram(s) IV Push once  glucagon  Injectable 1 milliGRAM(s) IntraMuscular once PRN  insulin lispro (HumaLOG) corrective regimen sliding scale   SubCutaneous three times a day before meals  letrozole 2.5 milliGRAM(s) Oral daily  melatonin 5 milliGRAM(s) Oral at bedtime  pyridoxine 50 milliGRAM(s) Oral daily  tamsulosin 0.4 milliGRAM(s) Oral at bedtime      LABS:    08-19    143  |  102  |  10  ----------------------------<  89  3.7   |  34<H>  |  0.37<L>    Ca    8.2<L>      19 Aug 2019 06:24  Mg     1.2     08-19              CAPILLARY BLOOD GLUCOSE      POCT Blood Glucose.: 94 mg/dL (20 Aug 2019 07:51)  POCT Blood Glucose.: 106 mg/dL (19 Aug 2019 21:25)  POCT Blood Glucose.: 104 mg/dL (19 Aug 2019 16:21)  POCT Blood Glucose.: 93 mg/dL (19 Aug 2019 11:43)          RECENT CULTURES:

## 2019-08-20 NOTE — PROGRESS NOTE ADULT - PROBLEM SELECTOR PLAN 2
fs and coverage
bs coverage
fs and coverage
management per primary team
bs coverage
bs coverage
fs and coverage
continue meds
continue meds

## 2019-08-20 NOTE — PROGRESS NOTE ADULT - PROBLEM SELECTOR PLAN 3
simefrainatin
continue meds
joy catheter as needed
simefrainatin
continue meds
on meds
simefrainatin
bs coverage
bs coverage

## 2019-08-20 NOTE — PROGRESS NOTE ADULT - PROBLEM SELECTOR PROBLEM 5
Subarachnoid hematoma, without loss of consciousness, subsequent encounter

## 2019-08-20 NOTE — PROGRESS NOTE ADULT - PROBLEM SELECTOR PROBLEM 1
Pneumonia of right lower lobe due to infectious organism

## 2019-08-20 NOTE — PROGRESS NOTE ADULT - PROBLEM SELECTOR PLAN 1
iv abx , and oxygen
continue iv  fluids and iv  antibiotic
covered for hcap/aspiration  clinically improving    leukocytosis resolved  afeb  oxygenation improving  complete 7 days of zosyn to complete course til   august 17.
iv abx , and oxygen
continue antibiotic
continue antibiotics
iv abx , and oxygen
continue  antibiotic
continue iv  antibiotic

## 2019-08-20 NOTE — PROGRESS NOTE ADULT - PROBLEM SELECTOR PROBLEM 3
Hyperlipidemia, unspecified hyperlipidemia type
Breast CA
Hyperlipidemia, unspecified hyperlipidemia type
Urinary retention
Breast CA
Breast CA
Hyperlipidemia, unspecified hyperlipidemia type
Type 2 diabetes mellitus with other diabetic arthropathy, without long-term current use of insulin
Type 2 diabetes mellitus with other diabetic arthropathy, without long-term current use of insulin

## 2019-08-20 NOTE — PROGRESS NOTE ADULT - PROVIDER SPECIALTY LIST ADULT
Internal Medicine
Neurology
Urology
Neurology
Neurology
Internal Medicine
Infectious Disease
Internal Medicine
Internal Medicine

## 2019-08-20 NOTE — PROGRESS NOTE ADULT - PROBLEM SELECTOR PROBLEM 2
Type 2 diabetes mellitus with other diabetic arthropathy, without long-term current use of insulin
Diabetes mellitus
Diabetes mellitus
Type 2 diabetes mellitus with other diabetic arthropathy, without long-term current use of insulin
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

## 2019-08-20 NOTE — PROGRESS NOTE ADULT - REASON FOR ADMISSION
pneumonia

## 2019-08-20 NOTE — DISCHARGE NOTE NURSING/CASE MANAGEMENT/SOCIAL WORK - NSDCFUADDAPPT_GEN_ALL_CORE_FT
follow up ortho for left hand and finger wrist fx , and right tumb fx , in few weeks , at [facility] ortho department

## 2019-08-20 NOTE — PROGRESS NOTE ADULT - PROBLEM SELECTOR PLAN 4
joy
letrizol
1/4 bottles cns contaminant.
letrizol
joy
letrizol
letrizol
stable
letrizol
pt  evaluation

## 2019-08-20 NOTE — DISCHARGE NOTE NURSING/CASE MANAGEMENT/SOCIAL WORK - NSDCDPATPORTLINK_GEN_ALL_CORE
You can access the [facility], offered by [facility], by registering with the following website: [email or web address]

## 2019-08-20 NOTE — PROGRESS NOTE ADULT - PROBLEM SELECTOR PROBLEM 4
Urinary retention
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
Blood culture positive for microorganism
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
Subarachnoid hematoma, without loss of consciousness, subsequent encounter
Urinary retention
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
Subarachnoid hematoma, without loss of consciousness, subsequent encounter

## 2019-09-09 NOTE — PHYSICAL THERAPY INITIAL EVALUATION ADULT - RANGE OF MOTION EXAMINATION, REHAB EVAL
Discharge Summary  Discharge Summary from Physical Therapy Report      Dates  PT visit: n/a   Number of Visits: eval only     Discharge Status of Patient: See MD Note dated 9/9/2019    Goals: Not Met    Discharge Plan: Patient to return to referring/providing physician    Comments Pt cancelled all PT appointments secondary to back pain.    Date of Discharge 9/9/2019        Noemi Horowitz, PT  Physical Therapist                       left elbow extension (-) ~40 degrees, left hand/wrsit in splint, right UE WFL but arthritic changes noted t/o. BLE WFL but limted at end range flexion/extension./bilateral upper extremity ROM was WFL (within functional limits)/deficits as listed below/bilateral lower extremity ROM was WFL (within functional limits)

## 2019-11-11 NOTE — ED PROVIDER NOTE - NS ED ATTENDING STATEMENT MOD
1. Give 1.3mg=0.26mL=26 on insulin syringe x 1 month, then give 1.4 mg=0.28mL=28 on insulin syringe thereafter.  2. Follow up in 4 months.  
Attending Only

## 2023-03-23 NOTE — ED ADULT TRIAGE NOTE - AS TEMP SITE
Status of Other Orders    Canceled    COMPREHENSIVE METABOLIC PANEL 03/23/23   Reason: Hemolyzed specimen. Test not performed.        oral

## 2025-03-17 NOTE — ED PROVIDER NOTE - RE-EVALUATION DATE/TIME:
Prior Authorization Approval    Medication: MOUNJARO 7.5 MG/0.5ML SC SOAJ  Authorization Effective Date: 2/13/2025  Authorization Expiration Date: 3/15/2026  Approved Dose/Quantity:   Reference #:     Insurance Company: [employer] - Phone [phone] Fax [phone]  Expected CoPay: $    CoPay Card Available:      Financial Assistance Needed:   Which Pharmacy is filling the prescription: [facility] - [id number] [facility] [place]  Pharmacy Notified: YES  Patient Notified: **Instructed pharmacy to notify patient when script is ready to /ship.**         10-Aug-2019 10:43